# Patient Record
Sex: FEMALE | Race: WHITE | NOT HISPANIC OR LATINO
[De-identification: names, ages, dates, MRNs, and addresses within clinical notes are randomized per-mention and may not be internally consistent; named-entity substitution may affect disease eponyms.]

---

## 2017-09-10 ENCOUNTER — HOSPITAL (OUTPATIENT)
Dept: OTHER | Age: 62
End: 2017-09-10
Attending: EMERGENCY MEDICINE

## 2017-09-10 LAB
ANALYZER ANC (IANC): NORMAL
ANION GAP SERPL CALC-SCNC: 10 MMOL/L (ref 10–20)
BASOPHILS # BLD: 0 THOUSAND/MCL (ref 0–0.3)
BASOPHILS NFR BLD: 1 %
BUN SERPL-MCNC: 11 MG/DL (ref 6–20)
BUN/CREAT SERPL: 21 (ref 7–25)
CALCIUM SERPL-MCNC: 9.1 MG/DL (ref 8.4–10.2)
CHLORIDE: 106 MMOL/L (ref 98–107)
CO2 SERPL-SCNC: 30 MMOL/L (ref 21–32)
CREAT SERPL-MCNC: 0.52 MG/DL (ref 0.51–0.95)
DIFFERENTIAL METHOD BLD: NORMAL
EOSINOPHIL # BLD: 0.2 THOUSAND/MCL (ref 0.1–0.5)
EOSINOPHIL NFR BLD: 2 %
ERYTHROCYTE [DISTWIDTH] IN BLOOD: 12.3 % (ref 11–15)
GLUCOSE SERPL-MCNC: 110 MG/DL (ref 65–99)
HEMATOCRIT: 41.6 % (ref 36–46.5)
HGB BLD-MCNC: 14 GM/DL (ref 12–15.5)
LYMPHOCYTES # BLD: 2.2 THOUSAND/MCL (ref 1–4)
LYMPHOCYTES NFR BLD: 28 %
MAGNESIUM SERPL-MCNC: 2 MG/DL (ref 1.7–2.4)
MCH RBC QN AUTO: 29.5 PG (ref 26–34)
MCHC RBC AUTO-ENTMCNC: 33.7 GM/DL (ref 32–36.5)
MCV RBC AUTO: 87.8 FL (ref 78–100)
MONOCYTES # BLD: 0.5 THOUSAND/MCL (ref 0.3–0.9)
MONOCYTES NFR BLD: 7 %
NEUTROPHILS # BLD: 5 THOUSAND/MCL (ref 1.8–7.7)
NEUTROPHILS NFR BLD: 62 %
NEUTS SEG NFR BLD: NORMAL %
PERCENT NRBC: NORMAL
PLATELET # BLD: 258 THOUSAND/MCL (ref 140–450)
POTASSIUM SERPL-SCNC: 3.5 MMOL/L (ref 3.4–5.1)
RBC # BLD: 4.74 MILLION/MCL (ref 4–5.2)
SODIUM SERPL-SCNC: 142 MMOL/L (ref 135–145)
TSH SERPL-ACNC: 0.72 MCUNIT/ML (ref 0.35–5)
WBC # BLD: 7.9 THOUSAND/MCL (ref 4.2–11)

## 2018-02-03 ENCOUNTER — HOSPITAL (OUTPATIENT)
Dept: OTHER | Age: 63
End: 2018-02-03
Attending: EMERGENCY MEDICINE

## 2020-09-14 ENCOUNTER — OFFICE VISIT (OUTPATIENT)
Dept: FAMILY MEDICINE CLINIC | Facility: CLINIC | Age: 65
End: 2020-09-14
Payer: MEDICAID

## 2020-09-14 VITALS
WEIGHT: 163.06 LBS | SYSTOLIC BLOOD PRESSURE: 115 MMHG | HEIGHT: 65 IN | BODY MASS INDEX: 27.17 KG/M2 | DIASTOLIC BLOOD PRESSURE: 73 MMHG | HEART RATE: 88 BPM

## 2020-09-14 DIAGNOSIS — Z12.31 SCREENING MAMMOGRAM, ENCOUNTER FOR: ICD-10-CM

## 2020-09-14 DIAGNOSIS — Z12.11 ENCOUNTER FOR SCREENING COLONOSCOPY: ICD-10-CM

## 2020-09-14 DIAGNOSIS — E05.90 HYPERTHYROIDISM: Primary | ICD-10-CM

## 2020-09-14 PROCEDURE — 3078F DIAST BP <80 MM HG: CPT | Performed by: FAMILY MEDICINE

## 2020-09-14 PROCEDURE — 3074F SYST BP LT 130 MM HG: CPT | Performed by: FAMILY MEDICINE

## 2020-09-14 PROCEDURE — 3008F BODY MASS INDEX DOCD: CPT | Performed by: FAMILY MEDICINE

## 2020-09-14 PROCEDURE — 99202 OFFICE O/P NEW SF 15 MIN: CPT | Performed by: FAMILY MEDICINE

## 2020-09-14 RX ORDER — METHIMAZOLE 5 MG/1
2.5 TABLET ORAL
COMMUNITY
Start: 2016-11-23 | End: 2021-04-10

## 2020-09-14 NOTE — PROGRESS NOTES
Blood pressure 115/73, pulse 88, height 5' 5\" (1.651 m), weight 163 lb 1 oz (74 kg). Presents today for an initial visit. Has a history of hyperthyroidism. Remote history of total hip arthroplasty.   Concerned about some discolored skin on her leg den

## 2020-09-14 NOTE — PATIENT INSTRUCTIONS
Colonoscopy     A camera attached to a flexible tube with a viewing lens is used to take video pictures.    Colonoscopy is a test to view the inside of your lower digestive tract (colon and rectum). Sometimes it can show the last part of the small intesti · You are given relaxing (sedating) medicine through an IV line. You may be drowsy, or fully asleep. · The healthcare provider will first give you a physical exam to check for anal and rectal problems.   · Then the anus is lubricated and the scope inserted

## 2020-09-16 ENCOUNTER — LAB ENCOUNTER (OUTPATIENT)
Dept: LAB | Age: 65
End: 2020-09-16
Attending: FAMILY MEDICINE
Payer: MEDICAID

## 2020-09-16 DIAGNOSIS — E05.90 HYPERTHYROIDISM: ICD-10-CM

## 2020-09-16 LAB
ALBUMIN SERPL-MCNC: 4.3 G/DL (ref 3.4–5)
ALBUMIN/GLOB SERPL: 1.2 {RATIO} (ref 1–2)
ALP LIVER SERPL-CCNC: 54 U/L (ref 50–130)
ALT SERPL-CCNC: 24 U/L (ref 13–56)
ANION GAP SERPL CALC-SCNC: 6 MMOL/L (ref 0–18)
AST SERPL-CCNC: 22 U/L (ref 15–37)
BILIRUB SERPL-MCNC: 0.3 MG/DL (ref 0.1–2)
BUN BLD-MCNC: 11 MG/DL (ref 7–18)
BUN/CREAT SERPL: 15.7 (ref 10–20)
CALCIUM BLD-MCNC: 9.5 MG/DL (ref 8.5–10.1)
CHLORIDE SERPL-SCNC: 106 MMOL/L (ref 98–112)
CHOLEST SMN-MCNC: 153 MG/DL (ref ?–200)
CO2 SERPL-SCNC: 28 MMOL/L (ref 21–32)
CREAT BLD-MCNC: 0.7 MG/DL (ref 0.55–1.02)
GLOBULIN PLAS-MCNC: 3.5 G/DL (ref 2.8–4.4)
GLUCOSE BLD-MCNC: 97 MG/DL (ref 70–99)
HDLC SERPL-MCNC: 46 MG/DL (ref 40–59)
LDLC SERPL CALC-MCNC: 81 MG/DL (ref ?–100)
M PROTEIN MFR SERPL ELPH: 7.8 G/DL (ref 6.4–8.2)
NONHDLC SERPL-MCNC: 107 MG/DL (ref ?–130)
OSMOLALITY SERPL CALC.SUM OF ELEC: 289 MOSM/KG (ref 275–295)
PATIENT FASTING Y/N/NP: YES
PATIENT FASTING Y/N/NP: YES
POTASSIUM SERPL-SCNC: 4 MMOL/L (ref 3.5–5.1)
SODIUM SERPL-SCNC: 140 MMOL/L (ref 136–145)
TRIGL SERPL-MCNC: 130 MG/DL (ref 30–149)
TSI SER-ACNC: 0.76 MIU/ML (ref 0.36–3.74)
VLDLC SERPL CALC-MCNC: 26 MG/DL (ref 0–30)

## 2020-09-16 PROCEDURE — 80061 LIPID PANEL: CPT

## 2020-09-16 PROCEDURE — 36415 COLL VENOUS BLD VENIPUNCTURE: CPT

## 2020-09-16 PROCEDURE — 80053 COMPREHEN METABOLIC PANEL: CPT

## 2020-09-16 PROCEDURE — 82306 VITAMIN D 25 HYDROXY: CPT

## 2020-09-16 PROCEDURE — 84443 ASSAY THYROID STIM HORMONE: CPT

## 2020-09-18 LAB — 25(OH)D3 SERPL-MCNC: 35.2 NG/ML (ref 30–100)

## 2020-09-19 ENCOUNTER — HOSPITAL ENCOUNTER (OUTPATIENT)
Dept: BONE DENSITY | Age: 65
Discharge: HOME | End: 2020-09-19
Attending: FAMILY MEDICINE
Payer: MEDICAID

## 2020-09-19 DIAGNOSIS — E05.90 HYPERTHYROIDISM: ICD-10-CM

## 2020-09-19 PROCEDURE — 77080 DXA BONE DENSITY AXIAL: CPT | Performed by: FAMILY MEDICINE

## 2020-10-02 ENCOUNTER — HOSPITAL ENCOUNTER (OUTPATIENT)
Dept: MAMMOGRAPHY | Age: 65
Discharge: HOME | End: 2020-10-02
Attending: FAMILY MEDICINE
Payer: MEDICAID

## 2020-10-02 DIAGNOSIS — Z12.31 SCREENING MAMMOGRAM, ENCOUNTER FOR: ICD-10-CM

## 2020-10-02 PROCEDURE — 77063 BREAST TOMOSYNTHESIS BI: CPT | Performed by: FAMILY MEDICINE

## 2020-10-02 PROCEDURE — 77067 SCR MAMMO BI INCL CAD: CPT | Performed by: FAMILY MEDICINE

## 2020-10-05 ENCOUNTER — LAB ENCOUNTER (OUTPATIENT)
Dept: LAB | Age: 65
End: 2020-10-05
Attending: INTERNAL MEDICINE
Payer: MEDICAID

## 2020-10-05 ENCOUNTER — HOSPITAL ENCOUNTER (OUTPATIENT)
Dept: GENERAL RADIOLOGY | Age: 65
Discharge: HOME | End: 2020-10-05
Attending: INTERNAL MEDICINE
Payer: MEDICAID

## 2020-10-05 ENCOUNTER — OFFICE VISIT (OUTPATIENT)
Dept: INTERNAL MEDICINE CLINIC | Facility: CLINIC | Age: 65
End: 2020-10-05
Payer: MEDICAID

## 2020-10-05 VITALS
BODY MASS INDEX: 26.82 KG/M2 | SYSTOLIC BLOOD PRESSURE: 130 MMHG | DIASTOLIC BLOOD PRESSURE: 72 MMHG | HEIGHT: 65 IN | RESPIRATION RATE: 18 BRPM | WEIGHT: 161 LBS | HEART RATE: 76 BPM

## 2020-10-05 DIAGNOSIS — R92.8 ABNORMAL MAMMOGRAM OF BOTH BREASTS: ICD-10-CM

## 2020-10-05 DIAGNOSIS — Z12.11 COLON CANCER SCREENING: ICD-10-CM

## 2020-10-05 DIAGNOSIS — Z98.890 HISTORY OF ARTHROPLASTY OF LEFT HIP: ICD-10-CM

## 2020-10-05 DIAGNOSIS — Z12.83 SCREENING FOR SKIN CANCER: ICD-10-CM

## 2020-10-05 DIAGNOSIS — Z01.00 EYE EXAM, ROUTINE: ICD-10-CM

## 2020-10-05 DIAGNOSIS — Z00.00 PHYSICAL EXAM, ANNUAL: Primary | ICD-10-CM

## 2020-10-05 DIAGNOSIS — Z00.00 PHYSICAL EXAM, ANNUAL: ICD-10-CM

## 2020-10-05 PROCEDURE — 99396 PREV VISIT EST AGE 40-64: CPT | Performed by: INTERNAL MEDICINE

## 2020-10-05 PROCEDURE — 3075F SYST BP GE 130 - 139MM HG: CPT | Performed by: INTERNAL MEDICINE

## 2020-10-05 PROCEDURE — 90686 IIV4 VACC NO PRSV 0.5 ML IM: CPT | Performed by: INTERNAL MEDICINE

## 2020-10-05 PROCEDURE — 73502 X-RAY EXAM HIP UNI 2-3 VIEWS: CPT | Performed by: INTERNAL MEDICINE

## 2020-10-05 PROCEDURE — 90471 IMMUNIZATION ADMIN: CPT | Performed by: INTERNAL MEDICINE

## 2020-10-05 PROCEDURE — 3078F DIAST BP <80 MM HG: CPT | Performed by: INTERNAL MEDICINE

## 2020-10-05 PROCEDURE — 85025 COMPLETE CBC W/AUTO DIFF WBC: CPT

## 2020-10-05 PROCEDURE — 36415 COLL VENOUS BLD VENIPUNCTURE: CPT

## 2020-10-05 PROCEDURE — 3008F BODY MASS INDEX DOCD: CPT | Performed by: INTERNAL MEDICINE
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
## 2020-10-06 ENCOUNTER — LAB ENCOUNTER (OUTPATIENT)
Dept: LAB | Age: 65
End: 2020-10-06
Attending: INTERNAL MEDICINE
Payer: MEDICAID

## 2020-10-06 DIAGNOSIS — Z00.00 PHYSICAL EXAM, ANNUAL: ICD-10-CM

## 2020-10-06 DIAGNOSIS — Z12.11 COLON CANCER SCREENING: ICD-10-CM

## 2020-10-06 PROCEDURE — 82274 ASSAY TEST FOR BLOOD FECAL: CPT

## 2020-10-11 PROBLEM — E05.90 HYPERTHYROIDISM: Status: ACTIVE | Noted: 2020-10-11

## 2020-10-11 PROBLEM — Z96.642 HISTORY OF TOTAL LEFT HIP ARTHROPLASTY: Status: ACTIVE | Noted: 2020-10-11

## 2020-10-13 ENCOUNTER — HOSPITAL ENCOUNTER (OUTPATIENT)
Dept: MAMMOGRAPHY | Facility: HOSPITAL | Age: 65
Discharge: HOME | End: 2020-10-13
Attending: FAMILY MEDICINE
Payer: MEDICAID

## 2020-10-13 ENCOUNTER — HOSPITAL ENCOUNTER (OUTPATIENT)
Dept: ULTRASOUND IMAGING | Facility: HOSPITAL | Age: 65
Discharge: HOME | End: 2020-10-13
Attending: FAMILY MEDICINE
Payer: MEDICAID

## 2020-10-13 DIAGNOSIS — R92.8 ABNORMAL MAMMOGRAM: ICD-10-CM

## 2020-10-13 PROCEDURE — 76642 ULTRASOUND BREAST LIMITED: CPT | Performed by: FAMILY MEDICINE

## 2020-10-13 PROCEDURE — 77066 DX MAMMO INCL CAD BI: CPT | Performed by: FAMILY MEDICINE

## 2020-10-13 PROCEDURE — 77062 BREAST TOMOSYNTHESIS BI: CPT | Performed by: FAMILY MEDICINE

## 2020-11-19 ENCOUNTER — OFFICE VISIT (OUTPATIENT)
Dept: DERMATOLOGY CLINIC | Facility: CLINIC | Age: 65
End: 2020-11-19
Payer: MEDICAID

## 2020-11-19 DIAGNOSIS — L82.1 SEBORRHEIC KERATOSES: Primary | ICD-10-CM

## 2020-11-19 DIAGNOSIS — D22.9 MULTIPLE NEVI: ICD-10-CM

## 2020-11-19 DIAGNOSIS — D23.60 BENIGN NEOPLASM OF SKIN OF UPPER LIMB, INCLUDING SHOULDER, UNSPECIFIED LATERALITY: ICD-10-CM

## 2020-11-19 DIAGNOSIS — D23.30 BENIGN NEOPLASM OF SKIN OF FACE: ICD-10-CM

## 2020-11-19 DIAGNOSIS — D23.70 BENIGN NEOPLASM OF SKIN OF LOWER LIMB, INCLUDING HIP, UNSPECIFIED LATERALITY: ICD-10-CM

## 2020-11-19 DIAGNOSIS — D23.4 BENIGN NEOPLASM OF SCALP AND SKIN OF NECK: ICD-10-CM

## 2020-11-19 DIAGNOSIS — D23.5 BENIGN NEOPLASM OF SKIN OF TRUNK, EXCEPT SCROTUM: ICD-10-CM

## 2020-11-19 PROCEDURE — 99203 OFFICE O/P NEW LOW 30 MIN: CPT | Performed by: DERMATOLOGY

## 2020-11-30 NOTE — PROGRESS NOTES
Nadege Sanchez is a 63year old female. HPI:     CC:  Patient presents with:  Full Skin Exam: new patient. patient present for a full body exam , patient states she has numerous moles on back and around waist and under breasts,some mole are slightly dis Substance and Sexual Activity      Alcohol use: Yes        Frequency: Monthly or less        Drinks per session: 1 or 2        Binge frequency: Never      Drug use: Never      Sexual activity: Not on file    Lifestyle      Physical activity        Days per reviewed as noted. ROS:  Denies any other systemic complaints. 10 point review of systems was completed. Pertinent positives and negatives noted in the the HPI. No new or changeing lesions other than noted above.  No fevers, chills, night sweats, unus Nevus at right anterior axilla. 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  Lesions on the waistline waxy tan papules and scattered nevi on posterior waistline. Multiple benign keratoses reassurance given.     Pattern lentigines no atypical features

## 2021-01-04 ENCOUNTER — OFFICE VISIT (OUTPATIENT)
Dept: OPTOMETRY | Facility: CLINIC | Age: 66
End: 2021-01-04
Payer: MEDICAID

## 2021-01-04 DIAGNOSIS — H52.4 HYPEROPIA WITH PRESBYOPIA, BILATERAL: ICD-10-CM

## 2021-01-04 DIAGNOSIS — H25.13 AGE-RELATED NUCLEAR CATARACT OF BOTH EYES: Primary | ICD-10-CM

## 2021-01-04 DIAGNOSIS — H52.03 HYPEROPIA WITH PRESBYOPIA, BILATERAL: ICD-10-CM

## 2021-01-04 PROCEDURE — 92004 COMPRE OPH EXAM NEW PT 1/>: CPT | Performed by: OPTOMETRIST

## 2021-01-04 PROCEDURE — 92015 DETERMINE REFRACTIVE STATE: CPT | Performed by: OPTOMETRIST

## 2021-01-04 NOTE — PATIENT INSTRUCTIONS
Hyperopia with presbyopia, bilateral  New glasses RX given to update as needed. Age-related nuclear cataract of both eyes  No treatment is required. Will continue to observe.

## 2021-01-04 NOTE — PROGRESS NOTES
Rancho Owens is a 63year old female. HPI:     HPI     Patient is in for an annual eye exam. Last EE was about two years ago for new glasses--wears FT bifocals.  Had a sharp pain in her right eye about two weeks ago that lasted a few seconds and sto Neuro/Psych     Oriented x3: Yes    Mood/Affect: Normal          Dilation     Both eyes: 1.0% Mydriacyl and 2.5% Omi Synephrine @ 1:10 PM            Additional Tests     [place]       Right Left     Normal Normal            Slit Lamp and Fundus Exam

## 2021-04-10 RX ORDER — METHIMAZOLE 5 MG/1
2.5 TABLET ORAL DAILY
Qty: 45 TABLET | Refills: 0 | Status: SHIPPED | OUTPATIENT
Start: 2021-04-10 | End: 2021-06-29

## 2021-04-10 NOTE — TELEPHONE ENCOUNTER
Patient requesting refill of thyroid medication Methimazole 2.5mg. Patient states she recently switched primary care doctors and now sees Pauline Gordon. Last office visit 10/05/21. Refill pended.

## 2021-04-24 ENCOUNTER — LAB ENCOUNTER (OUTPATIENT)
Dept: LAB | Age: 66
End: 2021-04-24
Attending: INTERNAL MEDICINE
Payer: MEDICAID

## 2021-04-24 ENCOUNTER — OFFICE VISIT (OUTPATIENT)
Dept: INTERNAL MEDICINE CLINIC | Facility: CLINIC | Age: 66
End: 2021-04-24
Payer: MEDICAID

## 2021-04-24 VITALS
HEART RATE: 82 BPM | SYSTOLIC BLOOD PRESSURE: 121 MMHG | DIASTOLIC BLOOD PRESSURE: 76 MMHG | WEIGHT: 168.19 LBS | RESPIRATION RATE: 17 BRPM | BODY MASS INDEX: 28.02 KG/M2 | HEIGHT: 65 IN

## 2021-04-24 DIAGNOSIS — E05.90 HYPERTHYROIDISM: ICD-10-CM

## 2021-04-24 DIAGNOSIS — E05.90 HYPERTHYROIDISM: Primary | ICD-10-CM

## 2021-04-24 PROCEDURE — 84481 FREE ASSAY (FT-3): CPT

## 2021-04-24 PROCEDURE — 3078F DIAST BP <80 MM HG: CPT | Performed by: INTERNAL MEDICINE

## 2021-04-24 PROCEDURE — 36415 COLL VENOUS BLD VENIPUNCTURE: CPT

## 2021-04-24 PROCEDURE — 3008F BODY MASS INDEX DOCD: CPT | Performed by: INTERNAL MEDICINE

## 2021-04-24 PROCEDURE — 99213 OFFICE O/P EST LOW 20 MIN: CPT | Performed by: INTERNAL MEDICINE

## 2021-04-24 PROCEDURE — 80053 COMPREHEN METABOLIC PANEL: CPT

## 2021-04-24 PROCEDURE — 84443 ASSAY THYROID STIM HORMONE: CPT

## 2021-04-24 PROCEDURE — 84439 ASSAY OF FREE THYROXINE: CPT

## 2021-04-24 PROCEDURE — 85025 COMPLETE CBC W/AUTO DIFF WBC: CPT

## 2021-04-24 PROCEDURE — 3074F SYST BP LT 130 MM HG: CPT | Performed by: INTERNAL MEDICINE

## 2021-04-24 NOTE — PROGRESS NOTES
HPI/Subjective:   Patient ID: Kizzy Haro is a 63year old female.   Presents for follow-up on hypertension thyroidism    HPI  Patient reports that she has been feeling well, denies palpitations, anxiety, no weight loss even gained weight over the win Hyperthyroidism  (primary encounter diagnosis)  Will check labs CBC CMP TSH with reflex, see endocrinology, continue methimazole for now  Orders Placed This Encounter      CBC W Differential W Platelet [E]      Comp Metabolic Panel (14) [E]      TSH [E]

## 2021-06-29 ENCOUNTER — OFFICE VISIT (OUTPATIENT)
Dept: ENDOCRINOLOGY CLINIC | Facility: CLINIC | Age: 66
End: 2021-06-29
Payer: MEDICAID

## 2021-06-29 VITALS
DIASTOLIC BLOOD PRESSURE: 77 MMHG | BODY MASS INDEX: 27.32 KG/M2 | SYSTOLIC BLOOD PRESSURE: 131 MMHG | HEART RATE: 76 BPM | WEIGHT: 164 LBS | HEIGHT: 65 IN | RESPIRATION RATE: 18 BRPM

## 2021-06-29 DIAGNOSIS — E05.20 TOXIC MULTINODULAR GOITER: Primary | ICD-10-CM

## 2021-06-29 PROCEDURE — 99244 OFF/OP CNSLTJ NEW/EST MOD 40: CPT | Performed by: INTERNAL MEDICINE

## 2021-06-29 PROCEDURE — 3008F BODY MASS INDEX DOCD: CPT | Performed by: INTERNAL MEDICINE

## 2021-06-29 PROCEDURE — 3078F DIAST BP <80 MM HG: CPT | Performed by: INTERNAL MEDICINE

## 2021-06-29 PROCEDURE — 3075F SYST BP GE 130 - 139MM HG: CPT | Performed by: INTERNAL MEDICINE

## 2021-06-29 RX ORDER — METHIMAZOLE 5 MG/1
2.5 TABLET ORAL EVERY MORNING
Qty: 45 TABLET | Refills: 1 | Status: SHIPPED | OUTPATIENT
Start: 2021-06-29 | End: 2022-01-19

## 2021-06-29 NOTE — H&P
Name: Susan Zamarripa  Date: 6/29/2021    Referring Physician: Madelin Lu    No chief complaint on file. HISTORY OF PRESENT ILLNESS   Susan Zamarripa is a 63year old female who presents for No chief complaint on file.   .  This is a 63year-old or discomfort while urinating  Psychiatric:  no acute distress, anxiety  or depression  Skin: normal moisturized skin    PHYSICAL EXAMINATION:  /77   Pulse 76   Resp 18   Ht 5' 5\" (1.651 m)   Wt 164 lb (74.4 kg)   BMI 27.29 kg/m²     General Appeara s.d.     OSTEOPOROSIS T SCORE: -2.5 s.d.       National Osteoporosis Foundation Clinician's Guide to Prevention and Treatment of Osteoporosis recommendations for treatment:   Post menopausal women and men age 48 and older presenting with the following shou ASPIRATION    Diagnosis:    1.  THYROID, RIGHT LOBE, ULTRASOUND-GUIDED FNA:    SATISFACTORY FOR EVALUATION. BENIGN. BENIGN FOLLICULAR NODULE. 2.  THYROID, LEFT LOBE, ULTRASOUND-GUIDED FNA:    SATISFACTORY FOR EVALUATION. BENIGN.   BENIGN FOLLICULAR N months after receiving SHAIKH.     ATD:  Continue with Methimazole 2.5mg PO qday  - Side effects discussed:  > pruritus, rash, urticaria, arthralgias, arthritis, fever, abnormal taste sensation, nausea, or vomiting in up to 13 percent of patients   > agranuloc

## 2022-01-04 ENCOUNTER — HOSPITAL ENCOUNTER (OUTPATIENT)
Dept: ULTRASOUND IMAGING | Age: 67
Discharge: HOME | End: 2022-01-04
Attending: INTERNAL MEDICINE
Payer: MEDICAID

## 2022-01-04 ENCOUNTER — LAB ENCOUNTER (OUTPATIENT)
Dept: LAB | Age: 67
End: 2022-01-04
Attending: INTERNAL MEDICINE
Payer: MEDICAID

## 2022-01-04 DIAGNOSIS — E05.20 TOXIC MULTINODULAR GOITER: ICD-10-CM

## 2022-01-04 LAB
ALBUMIN SERPL-MCNC: 4.1 G/DL (ref 3.4–5)
ALBUMIN/GLOB SERPL: 1.3 {RATIO} (ref 1–2)
ALP LIVER SERPL-CCNC: 54 U/L
ALT SERPL-CCNC: 24 U/L
ANION GAP SERPL CALC-SCNC: 6 MMOL/L (ref 0–18)
AST SERPL-CCNC: 23 U/L (ref 15–37)
BASOPHILS # BLD AUTO: 0.1 X10(3) UL (ref 0–0.2)
BASOPHILS NFR BLD AUTO: 1.1 %
BILIRUB SERPL-MCNC: 0.3 MG/DL (ref 0.1–2)
BUN BLD-MCNC: 13 MG/DL (ref 7–18)
BUN/CREAT SERPL: 15.1 (ref 10–20)
CALCIUM BLD-MCNC: 9.3 MG/DL (ref 8.5–10.1)
CHLORIDE SERPL-SCNC: 106 MMOL/L (ref 98–112)
CO2 SERPL-SCNC: 28 MMOL/L (ref 21–32)
CREAT BLD-MCNC: 0.86 MG/DL
DEPRECATED RDW RBC AUTO: 38.4 FL (ref 35.1–46.3)
EOSINOPHIL # BLD AUTO: 0.32 X10(3) UL (ref 0–0.7)
EOSINOPHIL NFR BLD AUTO: 3.4 %
ERYTHROCYTE [DISTWIDTH] IN BLOOD BY AUTOMATED COUNT: 11.9 % (ref 11–15)
FASTING STATUS PATIENT QL REPORTED: NO
GLOBULIN PLAS-MCNC: 3.1 G/DL (ref 2.8–4.4)
GLUCOSE BLD-MCNC: 81 MG/DL (ref 70–99)
HCT VFR BLD AUTO: 41.3 %
HGB BLD-MCNC: 13.6 G/DL
IMM GRANULOCYTES # BLD AUTO: 0.03 X10(3) UL (ref 0–1)
IMM GRANULOCYTES NFR BLD: 0.3 %
LYMPHOCYTES # BLD AUTO: 2.77 X10(3) UL (ref 1–4)
LYMPHOCYTES NFR BLD AUTO: 29.3 %
MCH RBC QN AUTO: 29 PG (ref 26–34)
MCHC RBC AUTO-ENTMCNC: 32.9 G/DL (ref 31–37)
MCV RBC AUTO: 88.1 FL
MONOCYTES # BLD AUTO: 0.75 X10(3) UL (ref 0.1–1)
MONOCYTES NFR BLD AUTO: 7.9 %
NEUTROPHILS # BLD AUTO: 5.48 X10 (3) UL (ref 1.5–7.7)
NEUTROPHILS # BLD AUTO: 5.48 X10(3) UL (ref 1.5–7.7)
NEUTROPHILS NFR BLD AUTO: 58 %
OSMOLALITY SERPL CALC.SUM OF ELEC: 289 MOSM/KG (ref 275–295)
PLATELET # BLD AUTO: 265 10(3)UL (ref 150–450)
POTASSIUM SERPL-SCNC: 3.9 MMOL/L (ref 3.5–5.1)
PROT SERPL-MCNC: 7.2 G/DL (ref 6.4–8.2)
RBC # BLD AUTO: 4.69 X10(6)UL
SODIUM SERPL-SCNC: 140 MMOL/L (ref 136–145)
T3FREE SERPL-MCNC: 2.54 PG/ML (ref 2.4–4.2)
T4 FREE SERPL-MCNC: 1.1 NG/DL (ref 0.8–1.7)
TSI SER-ACNC: 0.78 MIU/ML (ref 0.36–3.74)
WBC # BLD AUTO: 9.5 X10(3) UL (ref 4–11)

## 2022-01-04 PROCEDURE — 85025 COMPLETE CBC W/AUTO DIFF WBC: CPT

## 2022-01-04 PROCEDURE — 76536 US EXAM OF HEAD AND NECK: CPT | Performed by: INTERNAL MEDICINE

## 2022-01-04 PROCEDURE — 84443 ASSAY THYROID STIM HORMONE: CPT

## 2022-01-04 PROCEDURE — 36415 COLL VENOUS BLD VENIPUNCTURE: CPT

## 2022-01-04 PROCEDURE — 80053 COMPREHEN METABOLIC PANEL: CPT

## 2022-01-04 PROCEDURE — 84481 FREE ASSAY (FT-3): CPT

## 2022-01-04 PROCEDURE — 84439 ASSAY OF FREE THYROXINE: CPT

## 2022-01-19 ENCOUNTER — OFFICE VISIT (OUTPATIENT)
Dept: ENDOCRINOLOGY CLINIC | Facility: CLINIC | Age: 67
End: 2022-01-19
Payer: MEDICAID

## 2022-01-19 VITALS
RESPIRATION RATE: 16 BRPM | DIASTOLIC BLOOD PRESSURE: 74 MMHG | HEART RATE: 81 BPM | WEIGHT: 167 LBS | HEIGHT: 65 IN | BODY MASS INDEX: 27.82 KG/M2 | SYSTOLIC BLOOD PRESSURE: 122 MMHG

## 2022-01-19 DIAGNOSIS — E05.20 TOXIC MULTINODULAR GOITER: Primary | ICD-10-CM

## 2022-01-19 PROCEDURE — 3008F BODY MASS INDEX DOCD: CPT | Performed by: INTERNAL MEDICINE

## 2022-01-19 PROCEDURE — 3074F SYST BP LT 130 MM HG: CPT | Performed by: INTERNAL MEDICINE

## 2022-01-19 PROCEDURE — 99214 OFFICE O/P EST MOD 30 MIN: CPT | Performed by: INTERNAL MEDICINE

## 2022-01-19 PROCEDURE — 3078F DIAST BP <80 MM HG: CPT | Performed by: INTERNAL MEDICINE

## 2022-01-19 RX ORDER — METHIMAZOLE 5 MG/1
2.5 TABLET ORAL EVERY MORNING
Qty: 45 TABLET | Refills: 1 | Status: SHIPPED | OUTPATIENT
Start: 2022-01-19

## 2022-01-19 NOTE — PROGRESS NOTES
Name: Daniel Wu  Date: 1/19/22    Referring Physician: No ref. provider found    Patient presents with:  Thyroid Problem: follow up on toxic multinodular goiter.        HISTORY OF PRESENT ILLNESS   Daniel Wu is a 61year old female who prese weakness or numbness.   Head: normal  ENT: normal  Lungs: no shortness of breath, wheezing or PURVIS  Cardiovascular:  no chest pain or palpitations  Gastrointestinal:  no abdominal pain, bowel movement problems  Musculoskeletal: no muscle pain or arthralgia recommendations for treatment:   Post menopausal women and men age 48 and older presenting with the following should be considered for treatment:   * A hip or vertebral (clinical or morphometric) fracture   * T score < -2.5 at the femoral neck or spine aft LEFT LOBE, ULTRASOUND-GUIDED FNA:    SATISFACTORY FOR EVALUATION. BENIGN. BENIGN FOLLICULAR NODULE. 3.  THYROID, LEFT INFERIOR, ULTRASOUND-GUIDED FNA:    SATISFACTORY FOR EVALUATION. BENIGN.   BENIGN FOLLICULAR NODULE.    3/14/28:  PROCEDURE: [address] (0)     Margin:   Smooth (0)     Echogenic Foci:   None or large comet tail artifacts (0)         Total Score:   3   TI-RADS CLASSIFICATION:  3           ISTHMUS:  0.2 cm AP diameter in the midline.  Normal echogenicity.  No masses.     OTHER:   No masses office if she experiences sore throat, fever, jaundice, dark urine, light stools, abdominal pain, anorexia, nausea, rash or joint pains,   Check TSH, FT4, FT3, CMP, CBC with diff in 6 months  Check Thyroid US in 1 year- I have discussed with patient that e

## 2022-03-17 ENCOUNTER — TELEPHONE (OUTPATIENT)
Dept: INTERNAL MEDICINE CLINIC | Facility: CLINIC | Age: 67
End: 2022-03-17

## 2022-03-17 DIAGNOSIS — Z12.11 COLON CANCER SCREENING: Primary | ICD-10-CM

## 2022-06-06 ENCOUNTER — NURSE TRIAGE (OUTPATIENT)
Dept: INTERNAL MEDICINE CLINIC | Facility: CLINIC | Age: 67
End: 2022-06-06

## 2022-06-06 ENCOUNTER — TELEPHONE (OUTPATIENT)
Dept: INTERNAL MEDICINE CLINIC | Facility: CLINIC | Age: 67
End: 2022-06-06

## 2022-06-06 ENCOUNTER — OFFICE VISIT (OUTPATIENT)
Dept: INTERNAL MEDICINE CLINIC | Facility: CLINIC | Age: 67
End: 2022-06-06
Payer: MEDICAID

## 2022-06-06 VITALS
SYSTOLIC BLOOD PRESSURE: 127 MMHG | DIASTOLIC BLOOD PRESSURE: 77 MMHG | WEIGHT: 171.38 LBS | HEART RATE: 79 BPM | BODY MASS INDEX: 28.55 KG/M2 | HEIGHT: 65 IN

## 2022-06-06 DIAGNOSIS — E05.90 HYPERTHYROIDISM: ICD-10-CM

## 2022-06-06 DIAGNOSIS — M79.605 PAIN OF LEFT LOWER EXTREMITY: ICD-10-CM

## 2022-06-06 DIAGNOSIS — R60.0 BILATERAL LEG EDEMA: ICD-10-CM

## 2022-06-06 DIAGNOSIS — I83.893 SYMPTOMATIC VARICOSE VEINS, BILATERAL: ICD-10-CM

## 2022-06-06 DIAGNOSIS — Z00.00 PHYSICAL EXAM, ANNUAL: Primary | ICD-10-CM

## 2022-06-06 DIAGNOSIS — M25.50 ARTHRALGIA, UNSPECIFIED JOINT: ICD-10-CM

## 2022-06-06 DIAGNOSIS — Z12.31 BREAST CANCER SCREENING BY MAMMOGRAM: ICD-10-CM

## 2022-06-06 PROCEDURE — 3074F SYST BP LT 130 MM HG: CPT | Performed by: INTERNAL MEDICINE

## 2022-06-06 PROCEDURE — 3078F DIAST BP <80 MM HG: CPT | Performed by: INTERNAL MEDICINE

## 2022-06-06 PROCEDURE — 99396 PREV VISIT EST AGE 40-64: CPT | Performed by: INTERNAL MEDICINE

## 2022-06-06 PROCEDURE — 3008F BODY MASS INDEX DOCD: CPT | Performed by: INTERNAL MEDICINE

## 2022-06-06 NOTE — TELEPHONE ENCOUNTER
Diclofenac gel is available over the counter     Spoke to patients daughter and informed her this is available over the counter

## 2022-06-08 ENCOUNTER — LAB ENCOUNTER (OUTPATIENT)
Dept: LAB | Age: 67
End: 2022-06-08
Attending: INTERNAL MEDICINE
Payer: MEDICAID

## 2022-06-08 DIAGNOSIS — R60.0 LOCALIZED EDEMA: ICD-10-CM

## 2022-06-08 DIAGNOSIS — Z00.00 ROUTINE GENERAL MEDICAL EXAMINATION AT A HEALTH CARE FACILITY: Primary | ICD-10-CM

## 2022-06-08 LAB
ALBUMIN SERPL-MCNC: 3.9 G/DL (ref 3.4–5)
ALBUMIN/GLOB SERPL: 1.1 {RATIO} (ref 1–2)
ALP LIVER SERPL-CCNC: 50 U/L
ALT SERPL-CCNC: 28 U/L
ANION GAP SERPL CALC-SCNC: 5 MMOL/L (ref 0–18)
AST SERPL-CCNC: 20 U/L (ref 15–37)
BASOPHILS # BLD AUTO: 0.09 X10(3) UL (ref 0–0.2)
BASOPHILS NFR BLD AUTO: 1.2 %
BILIRUB SERPL-MCNC: 0.3 MG/DL (ref 0.1–2)
BUN BLD-MCNC: 14 MG/DL (ref 7–18)
BUN/CREAT SERPL: 20.3 (ref 10–20)
CALCIUM BLD-MCNC: 9.4 MG/DL (ref 8.5–10.1)
CHLORIDE SERPL-SCNC: 109 MMOL/L (ref 98–112)
CHOLEST SERPL-MCNC: 167 MG/DL (ref ?–200)
CO2 SERPL-SCNC: 31 MMOL/L (ref 21–32)
CREAT BLD-MCNC: 0.69 MG/DL
CRP SERPL-MCNC: <0.29 MG/DL (ref ?–0.3)
D DIMER PPP FEU-MCNC: 0.87 UG/ML FEU (ref ?–0.64)
DEPRECATED RDW RBC AUTO: 40.4 FL (ref 35.1–46.3)
EOSINOPHIL # BLD AUTO: 0.39 X10(3) UL (ref 0–0.7)
EOSINOPHIL NFR BLD AUTO: 5.3 %
ERYTHROCYTE [DISTWIDTH] IN BLOOD BY AUTOMATED COUNT: 12 % (ref 11–15)
ERYTHROCYTE [SEDIMENTATION RATE] IN BLOOD: 9 MM/HR
FASTING PATIENT LIPID ANSWER: YES
FASTING STATUS PATIENT QL REPORTED: YES
GLOBULIN PLAS-MCNC: 3.6 G/DL (ref 2.8–4.4)
GLUCOSE BLD-MCNC: 100 MG/DL (ref 70–99)
HCT VFR BLD AUTO: 46.1 %
HDLC SERPL-MCNC: 44 MG/DL (ref 40–59)
HGB BLD-MCNC: 14.5 G/DL
IMM GRANULOCYTES # BLD AUTO: 0.01 X10(3) UL (ref 0–1)
IMM GRANULOCYTES NFR BLD: 0.1 %
LDLC SERPL CALC-MCNC: 94 MG/DL (ref ?–100)
LYMPHOCYTES # BLD AUTO: 2.8 X10(3) UL (ref 1–4)
LYMPHOCYTES NFR BLD AUTO: 37.7 %
MCH RBC QN AUTO: 28.9 PG (ref 26–34)
MCHC RBC AUTO-ENTMCNC: 31.5 G/DL (ref 31–37)
MCV RBC AUTO: 91.8 FL
MONOCYTES # BLD AUTO: 0.5 X10(3) UL (ref 0.1–1)
MONOCYTES NFR BLD AUTO: 6.7 %
NEUTROPHILS # BLD AUTO: 3.63 X10 (3) UL (ref 1.5–7.7)
NEUTROPHILS # BLD AUTO: 3.63 X10(3) UL (ref 1.5–7.7)
NEUTROPHILS NFR BLD AUTO: 49 %
NONHDLC SERPL-MCNC: 123 MG/DL (ref ?–130)
OSMOLALITY SERPL CALC.SUM OF ELEC: 301 MOSM/KG (ref 275–295)
PLATELET # BLD AUTO: 252 10(3)UL (ref 150–450)
POTASSIUM SERPL-SCNC: 4.4 MMOL/L (ref 3.5–5.1)
PROT SERPL-MCNC: 7.5 G/DL (ref 6.4–8.2)
RBC # BLD AUTO: 5.02 X10(6)UL
RHEUMATOID FACT SERPL-ACNC: <10 IU/ML (ref ?–15)
SODIUM SERPL-SCNC: 145 MMOL/L (ref 136–145)
T3FREE SERPL-MCNC: 3.25 PG/ML (ref 2.4–4.2)
T4 FREE SERPL-MCNC: 1.1 NG/DL (ref 0.8–1.7)
TRIGL SERPL-MCNC: 168 MG/DL (ref 30–149)
TSI SER-ACNC: 1.12 MIU/ML (ref 0.36–3.74)
VLDLC SERPL CALC-MCNC: 28 MG/DL (ref 0–30)
WBC # BLD AUTO: 7.4 X10(3) UL (ref 4–11)

## 2022-06-08 PROCEDURE — 82306 VITAMIN D 25 HYDROXY: CPT | Performed by: INTERNAL MEDICINE

## 2022-06-08 PROCEDURE — 82607 VITAMIN B-12: CPT | Performed by: INTERNAL MEDICINE

## 2022-06-08 PROCEDURE — 86431 RHEUMATOID FACTOR QUANT: CPT | Performed by: INTERNAL MEDICINE

## 2022-06-08 PROCEDURE — 85025 COMPLETE CBC W/AUTO DIFF WBC: CPT | Performed by: INTERNAL MEDICINE

## 2022-06-08 PROCEDURE — 84443 ASSAY THYROID STIM HORMONE: CPT | Performed by: INTERNAL MEDICINE

## 2022-06-08 PROCEDURE — 85379 FIBRIN DEGRADATION QUANT: CPT | Performed by: INTERNAL MEDICINE

## 2022-06-08 PROCEDURE — 80061 LIPID PANEL: CPT

## 2022-06-08 PROCEDURE — 84439 ASSAY OF FREE THYROXINE: CPT | Performed by: INTERNAL MEDICINE

## 2022-06-08 PROCEDURE — 84481 FREE ASSAY (FT-3): CPT | Performed by: INTERNAL MEDICINE

## 2022-06-08 PROCEDURE — 86140 C-REACTIVE PROTEIN: CPT | Performed by: INTERNAL MEDICINE

## 2022-06-08 PROCEDURE — 36415 COLL VENOUS BLD VENIPUNCTURE: CPT | Performed by: INTERNAL MEDICINE

## 2022-06-08 PROCEDURE — 80053 COMPREHEN METABOLIC PANEL: CPT | Performed by: INTERNAL MEDICINE

## 2022-06-08 PROCEDURE — 85652 RBC SED RATE AUTOMATED: CPT | Performed by: INTERNAL MEDICINE

## 2022-06-10 ENCOUNTER — HOSPITAL ENCOUNTER (OUTPATIENT)
Dept: ULTRASOUND IMAGING | Facility: HOSPITAL | Age: 67
Discharge: HOME | End: 2022-06-10
Attending: INTERNAL MEDICINE
Payer: MEDICAID

## 2022-06-10 DIAGNOSIS — M79.605 PAIN OF LEFT LOWER EXTREMITY: ICD-10-CM

## 2022-06-10 PROCEDURE — 93971 EXTREMITY STUDY: CPT | Performed by: INTERNAL MEDICINE

## 2022-06-16 ENCOUNTER — HOSPITAL ENCOUNTER (OUTPATIENT)
Dept: MAMMOGRAPHY | Age: 67
Discharge: HOME | End: 2022-06-16
Attending: INTERNAL MEDICINE
Payer: MEDICAID

## 2022-06-16 DIAGNOSIS — Z12.31 BREAST CANCER SCREENING BY MAMMOGRAM: ICD-10-CM

## 2022-06-16 PROCEDURE — 77067 SCR MAMMO BI INCL CAD: CPT | Performed by: INTERNAL MEDICINE

## 2022-06-16 PROCEDURE — 77063 BREAST TOMOSYNTHESIS BI: CPT | Performed by: INTERNAL MEDICINE

## 2022-06-29 ENCOUNTER — TELEPHONE (OUTPATIENT)
Dept: INTERNAL MEDICINE CLINIC | Facility: CLINIC | Age: 67
End: 2022-06-29

## 2022-06-29 NOTE — TELEPHONE ENCOUNTER
Pt's daughter stated Pt had pnuemonia vaccine yesterday, by evening had h/a, chills, nausea, decrease appetite, took 2 advils but mentioned was on empty stomach  Advised to continue to monitor s/s- give med with food for h/a, these are some side effects , stay hydrated, rest , stated she will check on her today, if s/s are worse will call back, no s/s of site of vaccine

## 2022-07-13 ENCOUNTER — OFFICE VISIT (OUTPATIENT)
Dept: INTERNAL MEDICINE CLINIC | Facility: CLINIC | Age: 67
End: 2022-07-13
Payer: MEDICAID

## 2022-07-13 ENCOUNTER — LAB ENCOUNTER (OUTPATIENT)
Dept: LAB | Age: 67
End: 2022-07-13
Attending: INTERNAL MEDICINE
Payer: MEDICAID

## 2022-07-13 VITALS
HEIGHT: 65 IN | DIASTOLIC BLOOD PRESSURE: 84 MMHG | BODY MASS INDEX: 28.32 KG/M2 | SYSTOLIC BLOOD PRESSURE: 151 MMHG | WEIGHT: 170 LBS | OXYGEN SATURATION: 97 % | HEART RATE: 82 BPM

## 2022-07-13 DIAGNOSIS — M25.561 ACUTE PAIN OF RIGHT KNEE: ICD-10-CM

## 2022-07-13 DIAGNOSIS — Z01.419 ENCOUNTER FOR ROUTINE GYNECOLOGICAL EXAMINATION WITH PAPANICOLAOU SMEAR OF CERVIX: Primary | ICD-10-CM

## 2022-07-13 DIAGNOSIS — M62.838 MUSCLE SPASM: ICD-10-CM

## 2022-07-13 DIAGNOSIS — M25.542 ARTHRALGIA OF BOTH HANDS: ICD-10-CM

## 2022-07-13 DIAGNOSIS — M25.541 ARTHRALGIA OF BOTH HANDS: ICD-10-CM

## 2022-07-13 DIAGNOSIS — Z12.11 COLON CANCER SCREENING: ICD-10-CM

## 2022-07-13 LAB
C3 SERPL-MCNC: 114 MG/DL (ref 90–180)
C4 SERPL-MCNC: 34.4 MG/DL (ref 10–40)
MAGNESIUM SERPL-MCNC: 2.4 MG/DL (ref 1.6–2.6)
URATE SERPL-MCNC: 3.9 MG/DL

## 2022-07-13 PROCEDURE — 86225 DNA ANTIBODY NATIVE: CPT

## 2022-07-13 PROCEDURE — 86160 COMPLEMENT ANTIGEN: CPT | Performed by: INTERNAL MEDICINE

## 2022-07-13 PROCEDURE — 86039 ANTINUCLEAR ANTIBODIES (ANA): CPT

## 2022-07-13 PROCEDURE — 3077F SYST BP >= 140 MM HG: CPT | Performed by: INTERNAL MEDICINE

## 2022-07-13 PROCEDURE — 86038 ANTINUCLEAR ANTIBODIES: CPT

## 2022-07-13 PROCEDURE — 99214 OFFICE O/P EST MOD 30 MIN: CPT | Performed by: INTERNAL MEDICINE

## 2022-07-13 PROCEDURE — 84550 ASSAY OF BLOOD/URIC ACID: CPT | Performed by: INTERNAL MEDICINE

## 2022-07-13 PROCEDURE — 36415 COLL VENOUS BLD VENIPUNCTURE: CPT | Performed by: INTERNAL MEDICINE

## 2022-07-13 PROCEDURE — 86235 NUCLEAR ANTIGEN ANTIBODY: CPT | Performed by: INTERNAL MEDICINE

## 2022-07-13 PROCEDURE — 3079F DIAST BP 80-89 MM HG: CPT | Performed by: INTERNAL MEDICINE

## 2022-07-13 PROCEDURE — 3008F BODY MASS INDEX DOCD: CPT | Performed by: INTERNAL MEDICINE

## 2022-07-13 PROCEDURE — 83735 ASSAY OF MAGNESIUM: CPT | Performed by: INTERNAL MEDICINE

## 2022-07-13 PROCEDURE — 86200 CCP ANTIBODY: CPT | Performed by: INTERNAL MEDICINE

## 2022-07-13 RX ORDER — ALPRAZOLAM 0.25 MG/1
0.25 TABLET ORAL 2 TIMES DAILY PRN
Qty: 20 TABLET | Refills: 0 | Status: SHIPPED | OUTPATIENT
Start: 2022-07-13

## 2022-07-13 RX ORDER — MELOXICAM 15 MG/1
15 TABLET ORAL DAILY
Qty: 90 TABLET | Refills: 0 | Status: SHIPPED | OUTPATIENT
Start: 2022-07-13

## 2022-07-14 ENCOUNTER — HOSPITAL ENCOUNTER (OUTPATIENT)
Dept: GENERAL RADIOLOGY | Age: 67
Discharge: HOME | End: 2022-07-14
Attending: INTERNAL MEDICINE
Payer: MEDICAID

## 2022-07-14 ENCOUNTER — LAB ENCOUNTER (OUTPATIENT)
Dept: LAB | Age: 67
End: 2022-07-14
Attending: INTERNAL MEDICINE
Payer: MEDICAID

## 2022-07-14 DIAGNOSIS — M25.541 ARTHRALGIA OF BOTH HANDS: ICD-10-CM

## 2022-07-14 DIAGNOSIS — M25.561 ACUTE PAIN OF RIGHT KNEE: ICD-10-CM

## 2022-07-14 DIAGNOSIS — M25.542 ARTHRALGIA OF BOTH HANDS: ICD-10-CM

## 2022-07-14 LAB
HEMOCCULT STL QL: NEGATIVE
NUCLEAR IGG TITR SER IF: POSITIVE {TITER}

## 2022-07-14 PROCEDURE — 82274 ASSAY TEST FOR BLOOD FECAL: CPT | Performed by: INTERNAL MEDICINE

## 2022-07-14 PROCEDURE — 73130 X-RAY EXAM OF HAND: CPT | Performed by: INTERNAL MEDICINE

## 2022-07-15 LAB
ANA NUCLEOLAR TITR SER IF: 160 {TITER}
CCP IGG SERPL-ACNC: 1.7 U/ML (ref 0–6.9)
DSDNA AB TITR SER: <10 {TITER}

## 2022-07-18 ENCOUNTER — TELEPHONE (OUTPATIENT)
Dept: INTERNAL MEDICINE CLINIC | Facility: CLINIC | Age: 67
End: 2022-07-18

## 2022-07-18 DIAGNOSIS — M79.605 PAIN OF LEFT LOWER EXTREMITY: Primary | ICD-10-CM

## 2022-07-18 NOTE — TELEPHONE ENCOUNTER
Please  verify with daughter  what leg should be ultrasounded? She complained of pain in the  right leg?   I am out of the office until July 25, but I check messages at times

## 2022-07-18 NOTE — TELEPHONE ENCOUNTER
Talked to patient's daughter, no increased symptoms (no redness, or swelling) but she still has pain while taking the stairs. Per the daughter, she haed discussion with dr José Foster and agreed on ordering US duplex if symptoms persist, ordered.

## 2022-07-18 NOTE — TELEPHONE ENCOUNTER
Pt's daughter calling back -stated Pt was seen 7/13/22 discussed right leg pain, thought it was more muscle aches, was advised if continue will order US since she will be flying Wednesday   Daughter stated right leg not warmor red, no swelling or fever but concern since she has a long flight  Dr Nia Heredia out of office sent to oncall per protocol

## 2022-07-19 ENCOUNTER — PATIENT MESSAGE (OUTPATIENT)
Dept: OTHER | Age: 67
End: 2022-07-19

## 2022-07-19 DIAGNOSIS — M79.604 PAIN IN RIGHT LEG: Primary | ICD-10-CM

## 2022-07-19 NOTE — TELEPHONE ENCOUNTER
already addressed. Marcela Gallegos RN 7/19/2022  2:05 PM CDT        ----- Message -----  From: Fran Whiteside  Sent: 7/19/2022  10:47 AM CDT  To: Em Rn Triage  Subject: Leg ultrasound                                   It is the right calf area but it is a bit better today .

## 2022-07-20 LAB
ENA SS-A AB SER QL IA: NEGATIVE
ENA SS-B AB SER QL IA: NEGATIVE

## 2022-08-18 ENCOUNTER — MED REC SCAN ONLY (OUTPATIENT)
Dept: INTERNAL MEDICINE CLINIC | Facility: CLINIC | Age: 67
End: 2022-08-18

## 2022-08-23 ENCOUNTER — TELEPHONE (OUTPATIENT)
Dept: CASE MANAGEMENT | Age: 67
End: 2022-08-23

## 2022-08-23 NOTE — TELEPHONE ENCOUNTER
Hello, please see the message below. Patient's health insurance has a different primary care physician listed that is not part of the [facility] group. Patient would need to update this in order for the ultrasound to be approved and then contact us. If she is experiencing any severe or worsening symptoms she should go to the ER.

## 2022-08-23 NOTE — TELEPHONE ENCOUNTER
Good Morning Dr Abebe Chong and staff,    Ref# [id number]/ ultra sound    I am unable to obtain prior auth thru [address] [place] [address] Road for above ordered test.    Patients PCP is listed as Dr Dorothea Lester not an [facility] physician. Patient should not be seen at [facility] until she updates the PCP with [facility]. Last time her insurance card was copied into chart was 2020 which shows PCP Pratik . Please have office staff reach out to patient to contact her health plan to update her PCP to [place], [facility]. This referral will be closed since I am unable to obtain prior auth.     Thank you for your help    [facility]

## 2022-09-08 ENCOUNTER — OFFICE VISIT (OUTPATIENT)
Dept: RHEUMATOLOGY | Facility: CLINIC | Age: 67
End: 2022-09-08
Payer: MEDICAID

## 2022-09-08 VITALS
HEART RATE: 69 BPM | BODY MASS INDEX: 28.49 KG/M2 | WEIGHT: 171 LBS | HEIGHT: 65 IN | DIASTOLIC BLOOD PRESSURE: 70 MMHG | SYSTOLIC BLOOD PRESSURE: 126 MMHG

## 2022-09-08 DIAGNOSIS — M79.642 BILATERAL HAND PAIN: Primary | ICD-10-CM

## 2022-09-08 DIAGNOSIS — M79.641 BILATERAL HAND PAIN: Primary | ICD-10-CM

## 2022-09-08 DIAGNOSIS — M15.4 EROSIVE OSTEOARTHRITIS OF BOTH HANDS: ICD-10-CM

## 2022-09-08 PROCEDURE — 3074F SYST BP LT 130 MM HG: CPT | Performed by: INTERNAL MEDICINE

## 2022-09-08 PROCEDURE — 3078F DIAST BP <80 MM HG: CPT | Performed by: INTERNAL MEDICINE

## 2022-09-08 PROCEDURE — 99244 OFF/OP CNSLTJ NEW/EST MOD 40: CPT | Performed by: INTERNAL MEDICINE

## 2022-09-08 PROCEDURE — 3008F BODY MASS INDEX DOCD: CPT | Performed by: INTERNAL MEDICINE

## 2022-09-08 NOTE — PATIENT INSTRUCTIONS
You were seen today for pain in your hands  Blood work and x-rays show osteoarthritis  No evidence of rheumatoid arthritis  As your symptoms are worse in the winter please come back and see me at that time  We can then determine what medications we need to put you on

## 2022-09-24 DIAGNOSIS — E05.20 TOXIC MULTINODULAR GOITER: ICD-10-CM

## 2022-09-26 RX ORDER — METHIMAZOLE 5 MG/1
TABLET ORAL
Qty: 45 TABLET | Refills: 0 | Status: SHIPPED | OUTPATIENT
Start: 2022-09-26 | End: 2022-11-30

## 2022-09-26 NOTE — TELEPHONE ENCOUNTER
LOV 1/19/22  Pended 3 month supply for review. [facility] sent reminding patient she will be due for a follow up appointment.    Pended 3 month supply for review

## 2022-09-29 NOTE — TELEPHONE ENCOUNTER
Called pt, pt's daughter answered HIPAA verified, relayed MD's message below.  She verbalized understanding with no further questions

## 2022-10-21 ENCOUNTER — LAB ENCOUNTER (OUTPATIENT)
Dept: LAB | Age: 67
End: 2022-10-21
Attending: INTERNAL MEDICINE
Payer: MEDICAID

## 2022-10-21 DIAGNOSIS — E05.20 TOXIC MULTINODULAR GOITER: ICD-10-CM

## 2022-10-21 LAB
T4 FREE SERPL-MCNC: 1.2 NG/DL (ref 0.8–1.7)
TSI SER-ACNC: 0.27 MIU/ML (ref 0.36–3.74)

## 2022-10-21 PROCEDURE — 84439 ASSAY OF FREE THYROXINE: CPT

## 2022-10-21 PROCEDURE — 84443 ASSAY THYROID STIM HORMONE: CPT

## 2022-10-21 PROCEDURE — 36415 COLL VENOUS BLD VENIPUNCTURE: CPT

## 2022-11-11 ENCOUNTER — APPOINTMENT (OUTPATIENT)
Dept: GENERAL RADIOLOGY | Age: 67
End: 2022-11-11
Attending: EMERGENCY MEDICINE
Payer: MEDICAID

## 2022-11-11 ENCOUNTER — HOSPITAL ENCOUNTER (OUTPATIENT)
Age: 67
Discharge: HOME | End: 2022-11-11
Payer: MEDICAID

## 2022-11-11 ENCOUNTER — APPOINTMENT (OUTPATIENT)
Dept: GENERAL RADIOLOGY | Age: 67
End: 2022-11-11
Attending: NURSE PRACTITIONER
Payer: MEDICAID

## 2022-11-11 VITALS
RESPIRATION RATE: 20 BRPM | OXYGEN SATURATION: 98 % | SYSTOLIC BLOOD PRESSURE: 133 MMHG | HEART RATE: 83 BPM | TEMPERATURE: 98 F | DIASTOLIC BLOOD PRESSURE: 66 MMHG

## 2022-11-11 DIAGNOSIS — S60.512A ABRASION, HAND, LEFT, INITIAL ENCOUNTER: ICD-10-CM

## 2022-11-11 DIAGNOSIS — S20.212A CONTUSION OF RIB ON LEFT SIDE, INITIAL ENCOUNTER: ICD-10-CM

## 2022-11-11 DIAGNOSIS — S50.02XA CONTUSION OF LEFT ELBOW, INITIAL ENCOUNTER: ICD-10-CM

## 2022-11-11 DIAGNOSIS — S70.02XA CONTUSION OF LEFT HIP, INITIAL ENCOUNTER: ICD-10-CM
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
## 2022-11-11 PROCEDURE — 73502 X-RAY EXAM HIP UNI 2-3 VIEWS: CPT | Performed by: EMERGENCY MEDICINE

## 2022-11-11 PROCEDURE — 73080 X-RAY EXAM OF ELBOW: CPT | Performed by: EMERGENCY MEDICINE

## 2022-11-11 PROCEDURE — 71101 X-RAY EXAM UNILAT RIBS/CHEST: CPT | Performed by: EMERGENCY MEDICINE

## 2022-11-11 PROCEDURE — 73130 X-RAY EXAM OF HAND: CPT | Performed by: NURSE PRACTITIONER

## 2022-11-11 PROCEDURE — 99204 OFFICE O/P NEW MOD 45 MIN: CPT

## 2022-11-11 PROCEDURE — 99214 OFFICE O/P EST MOD 30 MIN: CPT

## 2022-11-11 RX ORDER — HYDROCODONE BITARTRATE AND ACETAMINOPHEN 5; 325 MG/1; MG/1
1 TABLET ORAL ONCE
Status: COMPLETED | OUTPATIENT
Start: 2022-11-11 | End: 2022-11-11

## 2022-11-11 NOTE — DISCHARGE INSTRUCTIONS
Continue over-the-counter ibuprofen or Tylenol for pain. You may try topical lidocaine patch or icy hot. You may apply over-the-counter bacitracin or Polysporin to abrasions on the hand and cover up with Band-Aids. You may alternate heat or ice to areas of discomfort. Follow-up with your primary care doctor if symptoms persist or worsen. If you develop worsening pain chest pain shortness of breath fever cough nausea or vomiting or pain location you did not initially have go to the nearest emergency department.

## 2022-11-11 NOTE — ED INITIAL ASSESSMENT (HPI)
Patient reports tripping and falling this afternoon while walking outside. + pain to left ribs, left elbow and left hip.  abrasions noted to left hand. Denies loc or striking her head.

## 2022-11-25 ENCOUNTER — TELEPHONE (OUTPATIENT)
Dept: ENDOCRINOLOGY CLINIC | Facility: CLINIC | Age: 67
End: 2022-11-25

## 2022-11-25 DIAGNOSIS — E05.20 TOXIC MULTINODULAR GOITER: Primary | ICD-10-CM

## 2022-11-26 NOTE — TELEPHONE ENCOUNTER
Hi!  Can we please contact this patient and find out if she is still taking the methimazole? Thank you!

## 2022-11-29 ENCOUNTER — TELEPHONE (OUTPATIENT)
Dept: INTERNAL MEDICINE CLINIC | Facility: CLINIC | Age: 67
End: 2022-11-29

## 2022-11-29 DIAGNOSIS — M15.0 PRIMARY GENERALIZED (OSTEO)ARTHRITIS: Primary | ICD-10-CM

## 2022-11-29 NOTE — TELEPHONE ENCOUNTER
Good Afternoon Dr Birtha Claude and staff    Please review pended order for Rheumatology, Dr Da Tellez if you agree with plan of care.     Thank you    Jody Germain

## 2022-11-29 NOTE — TELEPHONE ENCOUNTER
Patient is requesting referral.     Name of specialist and specialty department : Rheumatology Dr. Shila Jarrett  Reason for visit with the specialist:  Osteo Arhthritis  Address of the specialist office: [address] [facility]  Appointment date:    none but wants an appt for Jan.      [facility] informed patient the turnaround time for referral is 5-7 business days. Patient was informed to check their [employer] account for referral status.

## 2022-11-29 NOTE — TELEPHONE ENCOUNTER
Pt's daughter is calling back to correct that the pt is taking half tablets of the 5 mg, not a full tablet.     So she takes 2.5 mg for the methimazole

## 2022-11-30 RX ORDER — METHIMAZOLE 5 MG/1
5 TABLET ORAL EVERY MORNING
Qty: 90 TABLET | Refills: 0 | Status: SHIPPED | OUTPATIENT
Start: 2022-11-30 | End: 2023-02-28

## 2022-12-01 NOTE — TELEPHONE ENCOUNTER
Spoke to patient's daughter Stepan Waldron, verbalized understanding and acknowledged. No further questions.

## 2022-12-09 ENCOUNTER — OFFICE VISIT (OUTPATIENT)
Dept: RHEUMATOLOGY | Facility: CLINIC | Age: 67
End: 2022-12-09
Payer: MEDICARE

## 2022-12-09 VITALS
WEIGHT: 175 LBS | BODY MASS INDEX: 29.16 KG/M2 | HEIGHT: 65 IN | DIASTOLIC BLOOD PRESSURE: 79 MMHG | HEART RATE: 70 BPM | SYSTOLIC BLOOD PRESSURE: 132 MMHG

## 2022-12-09 DIAGNOSIS — M79.642 BILATERAL HAND PAIN: Primary | ICD-10-CM

## 2022-12-09 DIAGNOSIS — M79.641 BILATERAL HAND PAIN: Primary | ICD-10-CM

## 2022-12-09 DIAGNOSIS — L60.0 INGROWN NAIL OF GREAT TOE OF LEFT FOOT: ICD-10-CM

## 2022-12-09 RX ORDER — MELOXICAM 15 MG/1
15 TABLET ORAL DAILY
Qty: 30 TABLET | Refills: 0 | Status: SHIPPED | OUTPATIENT
Start: 2022-12-09

## 2022-12-09 NOTE — PATIENT INSTRUCTIONS
You were seen for joint pain in your hands  Try meloxicam once a day for your pain  Also prescribed the Voltaren gel  Get ultrasound of your hands

## 2022-12-28 ENCOUNTER — HOSPITAL ENCOUNTER (OUTPATIENT)
Dept: ULTRASOUND IMAGING | Facility: HOSPITAL | Age: 67
Discharge: HOME | End: 2022-12-28
Attending: NURSE PRACTITIONER
Payer: MEDICARE

## 2022-12-28 DIAGNOSIS — M79.604 PAIN IN RIGHT LEG: ICD-10-CM

## 2022-12-28 PROCEDURE — 93971 EXTREMITY STUDY: CPT | Performed by: NURSE PRACTITIONER

## 2023-01-19 ENCOUNTER — HOSPITAL ENCOUNTER (OUTPATIENT)
Dept: ULTRASOUND IMAGING | Age: 68
Discharge: HOME | End: 2023-01-19
Attending: INTERNAL MEDICINE
Payer: MEDICARE

## 2023-01-19 DIAGNOSIS — E05.20 TOXIC MULTINODULAR GOITER: ICD-10-CM

## 2023-01-19 PROCEDURE — 76536 US EXAM OF HEAD AND NECK: CPT | Performed by: INTERNAL MEDICINE

## 2023-01-24 ENCOUNTER — LAB ENCOUNTER (OUTPATIENT)
Dept: LAB | Facility: HOSPITAL | Age: 68
End: 2023-01-24
Attending: INTERNAL MEDICINE
Payer: MEDICARE

## 2023-01-24 ENCOUNTER — OFFICE VISIT (OUTPATIENT)
Dept: ENDOCRINOLOGY CLINIC | Facility: CLINIC | Age: 68
End: 2023-01-24

## 2023-01-24 VITALS
BODY MASS INDEX: 29 KG/M2 | WEIGHT: 177 LBS | SYSTOLIC BLOOD PRESSURE: 151 MMHG | HEART RATE: 88 BPM | DIASTOLIC BLOOD PRESSURE: 84 MMHG

## 2023-01-24 DIAGNOSIS — E05.20 TOXIC MULTINODULAR GOITER: ICD-10-CM

## 2023-01-24 DIAGNOSIS — E05.20 TOXIC MULTINODULAR GOITER: Primary | ICD-10-CM

## 2023-01-24 LAB
T3FREE SERPL-MCNC: 3.08 PG/ML (ref 2.4–4.2)
T4 FREE SERPL-MCNC: 1.1 NG/DL (ref 0.8–1.7)
TSI SER-ACNC: 1.24 MIU/ML (ref 0.36–3.74)

## 2023-01-24 PROCEDURE — 84443 ASSAY THYROID STIM HORMONE: CPT

## 2023-01-24 PROCEDURE — 99214 OFFICE O/P EST MOD 30 MIN: CPT | Performed by: INTERNAL MEDICINE

## 2023-01-24 PROCEDURE — 84439 ASSAY OF FREE THYROXINE: CPT

## 2023-01-24 PROCEDURE — 36415 COLL VENOUS BLD VENIPUNCTURE: CPT

## 2023-01-24 PROCEDURE — 84481 FREE ASSAY (FT-3): CPT

## 2023-01-25 ENCOUNTER — TELEPHONE (OUTPATIENT)
Dept: ENDOCRINOLOGY CLINIC | Facility: CLINIC | Age: 68
End: 2023-01-25

## 2023-01-25 DIAGNOSIS — E05.20 TOXIC MULTINODULAR GOITER: Primary | ICD-10-CM

## 2023-01-25 RX ORDER — METHIMAZOLE 5 MG/1
5 TABLET ORAL EVERY MORNING
Qty: 90 TABLET | Refills: 0 | Status: SHIPPED | OUTPATIENT
Start: 2023-01-25 | End: 2023-04-25

## 2023-01-25 NOTE — TELEPHONE ENCOUNTER
Called patient and relayed Dr. Elizabeth Lebron message as outlined below. Pt voiced understanding and denied further questions at this time.

## 2023-01-25 NOTE — TELEPHONE ENCOUNTER
Hi!  Please let patient know that I have reviewed her blood tests and that I would like her to stay on the current dose of MMI 5mg PO qday. I would like to check TSH and FT4 in 3 months once again. Thank you!

## 2023-01-26 ENCOUNTER — PATIENT MESSAGE (OUTPATIENT)
Dept: RHEUMATOLOGY | Facility: CLINIC | Age: 68
End: 2023-01-26

## 2023-01-26 ENCOUNTER — TELEPHONE (OUTPATIENT)
Dept: RHEUMATOLOGY | Facility: CLINIC | Age: 68
End: 2023-01-26

## 2023-01-26 ENCOUNTER — HOSPITAL ENCOUNTER (OUTPATIENT)
Dept: ULTRASOUND IMAGING | Facility: HOSPITAL | Age: 68
Discharge: HOME | End: 2023-01-26
Attending: INTERNAL MEDICINE
Payer: MEDICARE

## 2023-01-26 DIAGNOSIS — M79.641 BILATERAL HAND PAIN: ICD-10-CM

## 2023-01-26 DIAGNOSIS — M79.642 BILATERAL HAND PAIN: ICD-10-CM

## 2023-01-26 PROCEDURE — 76881 US COMPL JOINT R-T W/IMG: CPT | Performed by: INTERNAL MEDICINE

## 2023-01-27 NOTE — TELEPHONE ENCOUNTER
Spoke to patient and daughter Aston Rausch (HIPPA verified) relayed Dr. Carmen Alan message as shown below. Appointment offered and scheduled. Verbalized understanding and agreement to appointment made.

## 2023-01-30 ENCOUNTER — OFFICE VISIT (OUTPATIENT)
Dept: PODIATRY CLINIC | Facility: CLINIC | Age: 68
End: 2023-01-30

## 2023-01-30 VITALS — HEART RATE: 71 BPM | SYSTOLIC BLOOD PRESSURE: 155 MMHG | DIASTOLIC BLOOD PRESSURE: 88 MMHG

## 2023-01-30 DIAGNOSIS — L60.0 INGROWN TOENAIL: Primary | ICD-10-CM

## 2023-01-30 NOTE — PROGRESS NOTES
Per Dr Reyna Gaming request was draw one syringe with 6 ml Lidocaine 1% for this patient right big toe.  Patricia Sterling

## 2023-01-31 NOTE — PATIENT INSTRUCTIONS
Toenail Avulsion: Care Instructions    Your Care Instructions  Losing a toenail or fingernail because of an injury is called avulsion. The nail may be completely or partially torn off after a trauma to the area. Your doctor may have removed the nail, put part of it back into place, or repaired the nail bed. Your toe or finger may be sore after treatment. You may have stitches. You may have some swelling, color changes, and bloody crusting on or around the wound for 2 or 3 days. This is normal. Taking good care of your wound at home will help it heal quickly and reduce your chance of infection. The wound should heal within a few weeks. If completely removed, fingernails may take 6 months to grow back. Toenails may take 12 to 18 months to grow back. Injured nails may look different when they grow back. Follow-up care is a key part of your treatment and safety. Be sure to make and go to all appointments, and call your doctor if you are having problems. It's also a good idea to know your test results and keep a list of the medicines you take. How can you care for yourself at home?    -If possible, prop up the injured area on a pillow anytime you sit or lie down during the next 3 days. Try to keep it above the level of your heart. This will help reduce swelling. Leave the bandage on, and if you have stitches, do not get them wet for the first 24 to 48 hours. Use a plastic bag to cover the area when you shower. After the first 24 to 48 hours, you can remove the bandage and soak the foot in lukewarm water and betadine or epsom salt once daily. If the bandage sticks to the wound, use warm water to loosen it. Do not scrub or soak the area. You may cover the wound with a thin layer of neosporin and bandaid. , or gauze and coban bandage. Do not go swimming. If you have stitches, do not remove them on your own. Your doctor will tell you when to return to have the stitches removed.   Be safe with medicines. Take pain medicines exactly as directed. If the doctor gave you a prescription medicine for pain, take it as prescribed. If you are not taking a prescription pain medicine, ask your doctor if you can take an over-the-counter medicine. If your doctor prescribed antibiotics, take them as directed. Do not stop taking them just because you feel better. You need to take the full course of antibiotics. When should you call for help? Call your doctor now or seek immediate medical care if:    The skin near the wound is cool or pale or changes color. The wound starts to bleed, and blood soaks through the bandage. Oozing small amounts of blood is normal.  You have signs of infection, such as: Increased pain, swelling, warmth, or redness. Red streaks leading from your toe or finger. Pus draining from your toe or finger. Swollen lymph nodes in your neck, armpits, or groin. A fever. Watch closely for changes in your health, and be sure to contact your doctor if:    You have problems with the nail as it grows back. You do not get better as expected.

## 2023-02-03 ENCOUNTER — OFFICE VISIT (OUTPATIENT)
Dept: PODIATRY CLINIC | Facility: CLINIC | Age: 68
End: 2023-02-03

## 2023-02-03 VITALS — HEART RATE: 69 BPM | DIASTOLIC BLOOD PRESSURE: 83 MMHG | SYSTOLIC BLOOD PRESSURE: 134 MMHG

## 2023-02-03 DIAGNOSIS — L60.0 INGROWN TOENAIL OF LEFT FOOT: Primary | ICD-10-CM

## 2023-02-03 PROCEDURE — 11750 EXCISION NAIL&NAIL MATRIX: CPT | Performed by: STUDENT IN AN ORGANIZED HEALTH CARE EDUCATION/TRAINING PROGRAM

## 2023-02-15 ENCOUNTER — APPOINTMENT (OUTPATIENT)
Dept: ULTRASOUND IMAGING | Age: 68
End: 2023-02-15
Attending: EMERGENCY MEDICINE
Payer: MEDICARE

## 2023-02-15 ENCOUNTER — HOSPITAL ENCOUNTER (OUTPATIENT)
Age: 68
Discharge: HOME | End: 2023-02-15
Attending: EMERGENCY MEDICINE
Payer: MEDICARE

## 2023-02-15 ENCOUNTER — TELEPHONE (OUTPATIENT)
Dept: INTERNAL MEDICINE CLINIC | Facility: CLINIC | Age: 68
End: 2023-02-15

## 2023-02-15 VITALS
RESPIRATION RATE: 20 BRPM | SYSTOLIC BLOOD PRESSURE: 153 MMHG | HEART RATE: 80 BPM | DIASTOLIC BLOOD PRESSURE: 72 MMHG | TEMPERATURE: 98 F | OXYGEN SATURATION: 96 %

## 2023-02-15 DIAGNOSIS — I82.462 ACUTE DEEP VEIN THROMBOSIS (DVT) OF CALF MUSCLE VEIN OF LEFT LOWER EXTREMITY (HCC): Primary | ICD-10-CM

## 2023-02-15 DIAGNOSIS — M71.21 BAKER'S CYST OF KNEE, RIGHT: ICD-10-CM

## 2023-02-15 LAB
#MXD IC: 0.6 X10ˆ3/UL (ref 0.1–1)
BUN BLD-MCNC: 9 MG/DL (ref 7–18)
CHLORIDE BLD-SCNC: 102 MMOL/L (ref 98–112)
CO2 BLD-SCNC: 30 MMOL/L (ref 21–32)
CREAT BLD-MCNC: 0.7 MG/DL
GFR SERPLBLD BASED ON 1.73 SQ M-ARVRAT: 96 ML/MIN/1.73M2 (ref 60–?)
GLUCOSE BLD-MCNC: 104 MG/DL (ref 70–99)
HCT VFR BLD AUTO: 42.9 %
HCT VFR BLD CALC: 45 %
HGB BLD-MCNC: 14.1 G/DL
ISTAT IONIZED CALCIUM FOR CHEM 8: 1.19 MMOL/L (ref 1.12–1.32)
LYMPHOCYTES # BLD AUTO: 3.1 X10ˆ3/UL (ref 1–4)
LYMPHOCYTES NFR BLD AUTO: 35.5 %
MCH RBC QN AUTO: 28.5 PG (ref 26–34)
MCHC RBC AUTO-ENTMCNC: 32.9 G/DL (ref 31–37)
MCV RBC AUTO: 86.7 FL (ref 80–100)
MIXED CELL %: 6.7 %
NEUTROPHILS # BLD AUTO: 5.1 X10ˆ3/UL (ref 1.5–7.7)
NEUTROPHILS NFR BLD AUTO: 57.8 %
PLATELET # BLD AUTO: 268 X10ˆ3/UL (ref 150–450)
POTASSIUM BLD-SCNC: 4 MMOL/L (ref 3.6–5.1)
RBC # BLD AUTO: 4.95 X10ˆ6/UL
SODIUM BLD-SCNC: 142 MMOL/L (ref 136–145)
WBC # BLD AUTO: 8.8 X10ˆ3/UL (ref 4–11)

## 2023-02-15 PROCEDURE — 36415 COLL VENOUS BLD VENIPUNCTURE: CPT

## 2023-02-15 PROCEDURE — 93970 EXTREMITY STUDY: CPT | Performed by: EMERGENCY MEDICINE

## 2023-02-15 PROCEDURE — 99214 OFFICE O/P EST MOD 30 MIN: CPT

## 2023-02-15 PROCEDURE — 80047 BASIC METABLC PNL IONIZED CA: CPT

## 2023-02-15 PROCEDURE — 99215 OFFICE O/P EST HI 40 MIN: CPT

## 2023-02-15 PROCEDURE — 85025 COMPLETE CBC W/AUTO DIFF WBC: CPT | Performed by: EMERGENCY MEDICINE

## 2023-02-15 NOTE — ED INITIAL ASSESSMENT (HPI)
Patient with left lower extremity pain and swelling starting this morning. Left shin is tender to touch. Hx varicose veins.   Reports dvt after hip replacement in 2003

## 2023-02-16 ENCOUNTER — TELEPHONE (OUTPATIENT)
Dept: INTERNAL MEDICINE CLINIC | Facility: CLINIC | Age: 68
End: 2023-02-16

## 2023-02-16 ENCOUNTER — PATIENT MESSAGE (OUTPATIENT)
Dept: INTERNAL MEDICINE CLINIC | Facility: CLINIC | Age: 68
End: 2023-02-16

## 2023-02-16 DIAGNOSIS — I82.409 ACUTE DEEP VEIN THROMBOSIS (DVT) OF LOWER EXTREMITY, UNSPECIFIED LATERALITY, UNSPECIFIED VEIN (HCC): Primary | ICD-10-CM

## 2023-02-16 NOTE — TELEPHONE ENCOUNTER
Dr Marzena Duckworth contacted on-call provider. Patient seen at the [facility] and diagnosed with acute occlusive deep venous thrombosis of the paired distal left popliteal vein has developed. He discharged the patient with eliquis. She denied any chest pain or SOB. Her CBC  And Chem8 was WNL. He instructed the patient to follow up with her PCP.

## 2023-02-16 NOTE — TELEPHONE ENCOUNTER
She should NOT wear the stockings at this time to avoid increased pressure on the clot. She should see Vascular surgeon.  I will place the referral.

## 2023-02-16 NOTE — TELEPHONE ENCOUNTER
Dr Maryjo Lowe, please advise  Sent to Dexter Navas in office    Patient's Mitesh Archer calling (name and , TIFFANIE verified) states that the patient was seen in the [address] [place] [address] yesterday was dx with a blood clot in her left lower leg and she has a bakers cyst behind the right knee. Patient was started on the Eliquis from the [address] [place] [address] visit. IC visit 2/15/23    Can patient wear support stockings (socks)? Does she need to be measured? Family reports that they have the 15-20 light compression socks at home already, would this be okay for patient to wear? Family also has questions regarding the dx of the bakers cyst behind the right knee and interventions for that? Assisted with follow up appt: Future Appointments   Date Time Provider Alexis Sanchez   [facility]   2023  3:40 PM MD Andrew Mancuso     Family prefers to see PCP only.

## 2023-02-16 NOTE — TELEPHONE ENCOUNTER
Called daughter Isak Rao. Informed her of provider's recommendation. Daughter would also like to inform Dr Alhaji Steven of patient's condition and get her opinion also.

## 2023-02-17 NOTE — TELEPHONE ENCOUNTER
Darin Mart RN 2/17/2023 2:52 PM CST        ----- Message -----  From: Jackie Loop  Sent: 2/16/2023 3:11 PM CST  To: Em Rn Triage  Subject: Compression stockings     My apologizes , Dr Emperatriz Clark !  I was using the microphone to type this message, and your name was misspelled :-)

## 2023-02-17 NOTE — TELEPHONE ENCOUNTER
Daughter Katelynn Hurst calling back. Advised patient of Amy's note. She verbalized understanding but would still like to speak to Dr. Bob Carranza when she has a moment.      Katelynn Hurst: [phone]

## 2023-02-19 ENCOUNTER — TELEPHONE (OUTPATIENT)
Dept: INTERNAL MEDICINE CLINIC | Facility: CLINIC | Age: 68
End: 2023-02-19

## 2023-02-20 NOTE — TELEPHONE ENCOUNTER
Paging    Message # [phone]         2023 10:06a   [AMANDAM]  To:  From:  WALT Lindsey MD:  Phone#:  ----------------------------------------------------------------------  Melissa Sparrow [phone] RE EVELYN Song [facility] 67 RECENTLY HAS BLOOD CLOT ON LEFT LEG AND WE HAVE QUESTIONS Paged at number :  PAGE: [phone] at :  10:06

## 2023-02-20 NOTE — TELEPHONE ENCOUNTER
On rachel   Received page   pts daughter was with pt and pt is on blood thinner but ankle portion looks a little swollen and thinks its new   No redness or tenderness   Will monitor but if not better will go back to ER   Will f/u with pcp   Encounter closed

## 2023-02-21 ENCOUNTER — OFFICE VISIT (OUTPATIENT)
Dept: INTERNAL MEDICINE CLINIC | Facility: CLINIC | Age: 68
End: 2023-02-21

## 2023-02-21 VITALS
HEART RATE: 74 BPM | WEIGHT: 177 LBS | DIASTOLIC BLOOD PRESSURE: 77 MMHG | SYSTOLIC BLOOD PRESSURE: 137 MMHG | BODY MASS INDEX: 28.45 KG/M2 | HEIGHT: 66 IN

## 2023-02-21 DIAGNOSIS — B35.1 PAIN DUE TO ONYCHOMYCOSIS OF TOENAILS OF BOTH FEET: ICD-10-CM

## 2023-02-21 DIAGNOSIS — I82.4Z2 DVT, LOWER EXTREMITY, DISTAL, ACUTE, LEFT (HCC): Primary | ICD-10-CM

## 2023-02-21 DIAGNOSIS — M79.674 PAIN DUE TO ONYCHOMYCOSIS OF TOENAILS OF BOTH FEET: ICD-10-CM

## 2023-02-21 DIAGNOSIS — M79.675 PAIN DUE TO ONYCHOMYCOSIS OF TOENAILS OF BOTH FEET: ICD-10-CM

## 2023-02-21 PROCEDURE — 99214 OFFICE O/P EST MOD 30 MIN: CPT | Performed by: INTERNAL MEDICINE

## 2023-02-27 ENCOUNTER — TELEPHONE (OUTPATIENT)
Dept: HEMATOLOGY/ONCOLOGY | Facility: HOSPITAL | Age: 68
End: 2023-02-27

## 2023-02-27 NOTE — TELEPHONE ENCOUNTER
Patients Daughter  Reymundo King  Calling to schedule consult with Dr Daya Varma    Referring Provider: Conor Mares Referred To Provider: Solis Correia   Diagnosis: I82.4Z2 (ICD-10-CM) - 453.42 (ICD-9-CM) - DVT, lower extremity, distal, acute, left ([facility] [address]

## 2023-03-08 ENCOUNTER — OFFICE VISIT (OUTPATIENT)
Dept: HEMATOLOGY/ONCOLOGY | Facility: HOSPITAL | Age: 68
End: 2023-03-08
Attending: INTERNAL MEDICINE
Payer: MEDICARE

## 2023-03-08 VITALS
TEMPERATURE: 98 F | SYSTOLIC BLOOD PRESSURE: 146 MMHG | BODY MASS INDEX: 29.32 KG/M2 | OXYGEN SATURATION: 97 % | HEIGHT: 65 IN | HEART RATE: 70 BPM | RESPIRATION RATE: 16 BRPM | DIASTOLIC BLOOD PRESSURE: 77 MMHG | WEIGHT: 176 LBS

## 2023-03-08 DIAGNOSIS — Z51.81 ANTICOAGULATION MANAGEMENT ENCOUNTER: ICD-10-CM

## 2023-03-08 DIAGNOSIS — I82.402 DEEP VEIN THROMBOSIS (DVT) OF LEFT LOWER EXTREMITY, UNSPECIFIED CHRONICITY, UNSPECIFIED VEIN (HCC): Primary | ICD-10-CM

## 2023-03-08 DIAGNOSIS — Z79.01 ANTICOAGULATION MANAGEMENT ENCOUNTER: ICD-10-CM

## 2023-03-08 PROCEDURE — 99211 OFF/OP EST MAY X REQ PHY/QHP: CPT

## 2023-03-08 RX ORDER — CHOLECALCIFEROL (VITAMIN D3) 50 MCG
TABLET ORAL DAILY
COMMUNITY

## 2023-03-09 NOTE — CONSULTS
[facility]    PATIENT'S NAME: Boris Saunders   CONSULTING PHYSICIAN: [address] Omero Gonzales MD   PATIENT ACCOUNT #: [de-identified] LOCATION: [address] [place] [address] RECORD #: [id number] YOB: 1955   CONSULTATION DATE: 03/08/2023       [facility] NEW PATIENT CONSULT    REQUESTING PHYSICIAN:  Griffin Doshi MD.    REASON FOR CONSULTATION:  Left lower extremity DVT. HISTORY OF PRESENT ILLNESS:  The patient is a pleasant 59-year-old female being evaluated by Hematology for recurrent DVT. She was diagnosed with left lower extremity DVT 02/15/2023 and ultrasound in setting of left lower extremity swelling showed an acute occlusive DVT of one of the paired distal left popliteal veins. She previously had DVT treated with warfarin related to orthopedic issues 20 years ago. Her most recent episode has been treated with apixaban with improvement in her swelling. She denies any bruising or bleeding. She denies any chest pain. She is otherwise without complaints. PAST MEDICAL HISTORY:   DVT following orthopedic procedure 20 years ago, hyperthyroidism, history of left hip replacement, cataracts. MEDICATIONS:  Apixaban. SOCIAL HISTORY:  Denies any alcohol, tobacco, illicit drug use. FAMILY MEDICAL HISTORY:  No history of venous thromboembolic disease in the family. REVIEW OF SYSTEMS:  All other systems reviewed and negative x12. PHYSICAL EXAMINATION:    GENERAL:  No acute distress. Alert and oriented. VITAL SIGNS:  ECOG performance status is 0. Weight 79 kg. Blood pressure 146/77, pulse 70, respiratory 16, temperature 98.3, pulse ox 97% on room air. HEENT:  Moist mucous membranes. Oropharynx clear. NECK:  Supple. LUNGS:  Symmetric expansion. HEART:  Good distal pulses. ABDOMEN:  Soft. EXTREMITIES:  No edema. SKIN:  No visible lesions. LYMPHATICS:  No visible adenopathy. NEUROLOGIC:  Moving all extremities. Cranial nerves intact.   PSYCHIATRIC:  Appropriate mood, appropriate affect. MUSCULOSKELETAL:  No deformity. Ultrasound reviewed as per HPI. Please see above for details. ASSESSMENT AND PLAN:  The patient is a pleasant 68-year-old female being evaluated by Hematology for recurrent DVT. She had DVT  20 years ago after orthopedic procedure. As of 02/15/2023, she has left lower extremity DVT as outlined above. No removable provoking factor. She is doing well on apixaban. We will plan to treat her with 3 months of therapeutic anticoagulation and have her return to clinic with D-dimer. Given her history, we will plan to continue a preventative dose of apixaban 2.5 mg thereafter, if D-dimer has normalized. We would not recommend thrombophilia workup as this would not change our management. Thank you very much for the consultation request and for allowing us to participate in the care of this delightful patient. Dictated By Amanda Villatoro MD  d: 03/08/2023 13:40:42  t: 03/09/2023 01:05:26  Job [id number]  [facility]/    cc: Melida Litten, MD

## 2023-04-30 ENCOUNTER — OFFICE VISIT (OUTPATIENT)
Dept: FAMILY MEDICINE CLINIC | Facility: CLINIC | Age: 68
End: 2023-04-30
Payer: MEDICARE

## 2023-04-30 ENCOUNTER — HOSPITAL ENCOUNTER (OUTPATIENT)
Age: 68
Discharge: HOME | End: 2023-04-30
Attending: EMERGENCY MEDICINE
Payer: MEDICARE

## 2023-04-30 ENCOUNTER — APPOINTMENT (OUTPATIENT)
Dept: ULTRASOUND IMAGING | Age: 68
End: 2023-04-30
Attending: EMERGENCY MEDICINE
Payer: MEDICARE

## 2023-04-30 VITALS
DIASTOLIC BLOOD PRESSURE: 74 MMHG | RESPIRATION RATE: 16 BRPM | WEIGHT: 176 LBS | BODY MASS INDEX: 28.28 KG/M2 | TEMPERATURE: 97 F | SYSTOLIC BLOOD PRESSURE: 128 MMHG | HEART RATE: 75 BPM | HEIGHT: 66 IN | OXYGEN SATURATION: 97 %

## 2023-04-30 VITALS
TEMPERATURE: 98 F | HEART RATE: 74 BPM | SYSTOLIC BLOOD PRESSURE: 137 MMHG | OXYGEN SATURATION: 100 % | DIASTOLIC BLOOD PRESSURE: 69 MMHG | RESPIRATION RATE: 16 BRPM

## 2023-04-30 DIAGNOSIS — G89.29 CHRONIC PAIN OF RIGHT KNEE: ICD-10-CM

## 2023-04-30 DIAGNOSIS — M79.661 RIGHT CALF PAIN: Primary | ICD-10-CM

## 2023-04-30 DIAGNOSIS — M25.561 CHRONIC PAIN OF RIGHT KNEE: ICD-10-CM

## 2023-04-30 DIAGNOSIS — M79.661 PAIN OF RIGHT LOWER LEG: Primary | ICD-10-CM

## 2023-04-30 DIAGNOSIS — M25.561 ACUTE PAIN OF RIGHT KNEE: ICD-10-CM

## 2023-04-30 PROCEDURE — 99213 OFFICE O/P EST LOW 20 MIN: CPT

## 2023-04-30 PROCEDURE — 93971 EXTREMITY STUDY: CPT | Performed by: EMERGENCY MEDICINE

## 2023-04-30 PROCEDURE — 99214 OFFICE O/P EST MOD 30 MIN: CPT

## 2023-04-30 PROCEDURE — 99213 OFFICE O/P EST LOW 20 MIN: CPT | Performed by: PHYSICIAN ASSISTANT

## 2023-04-30 NOTE — ED INITIAL ASSESSMENT (HPI)
Patient with right leg pain posterior knee down to foot.   Pain for 10 months, has been seen by her doctor with diagnosis of bakers cyst  Patient with hx of blood clot to left leg  Pain is worse recently and is throbbing   No fevers  Was on a flight to and from [facility] a few weeks ago

## 2023-05-01 ENCOUNTER — TELEPHONE (OUTPATIENT)
Dept: INTERNAL MEDICINE CLINIC | Facility: CLINIC | Age: 68
End: 2023-05-01

## 2023-05-01 ENCOUNTER — OFFICE VISIT (OUTPATIENT)
Dept: INTERNAL MEDICINE CLINIC | Facility: CLINIC | Age: 68
End: 2023-05-01

## 2023-05-01 VITALS
BODY MASS INDEX: 28.45 KG/M2 | SYSTOLIC BLOOD PRESSURE: 136 MMHG | WEIGHT: 177 LBS | DIASTOLIC BLOOD PRESSURE: 80 MMHG | HEART RATE: 90 BPM | HEIGHT: 66 IN

## 2023-05-01 DIAGNOSIS — E05.20 TOXIC MULTINODULAR GOITER: ICD-10-CM

## 2023-05-01 DIAGNOSIS — E05.90 HYPERTHYROIDISM: ICD-10-CM

## 2023-05-01 DIAGNOSIS — Z12.31 ENCOUNTER FOR SCREENING MAMMOGRAM FOR MALIGNANT NEOPLASM OF BREAST: ICD-10-CM

## 2023-05-01 DIAGNOSIS — Z13.220 ENCOUNTER FOR SCREENING FOR LIPID DISORDER: ICD-10-CM

## 2023-05-01 DIAGNOSIS — H25.13 AGE-RELATED NUCLEAR CATARACT OF BOTH EYES: ICD-10-CM

## 2023-05-01 DIAGNOSIS — M71.21 BAKER CYST, RIGHT: ICD-10-CM

## 2023-05-01 DIAGNOSIS — Z78.0 POST-MENOPAUSAL: ICD-10-CM

## 2023-05-01 DIAGNOSIS — H52.4 HYPEROPIA WITH PRESBYOPIA, BILATERAL: ICD-10-CM

## 2023-05-01 DIAGNOSIS — H52.03 HYPEROPIA WITH PRESBYOPIA, BILATERAL: ICD-10-CM

## 2023-05-01 DIAGNOSIS — Z12.11 ENCOUNTER FOR SCREENING FOR MALIGNANT NEOPLASM OF COLON: ICD-10-CM

## 2023-05-01 DIAGNOSIS — Z13.0 SCREENING FOR DEFICIENCY ANEMIA: ICD-10-CM

## 2023-05-01 DIAGNOSIS — Z96.642 HISTORY OF TOTAL LEFT HIP ARTHROPLASTY: ICD-10-CM

## 2023-05-01 DIAGNOSIS — Z00.00 ENCOUNTER FOR ANNUAL HEALTH EXAMINATION: Primary | ICD-10-CM

## 2023-05-01 PROCEDURE — 99203 OFFICE O/P NEW LOW 30 MIN: CPT | Performed by: NURSE PRACTITIONER

## 2023-05-01 PROCEDURE — G0402 INITIAL PREVENTIVE EXAM: HCPCS | Performed by: NURSE PRACTITIONER

## 2023-05-01 NOTE — TELEPHONE ENCOUNTER
Patient's daughter, Ashley Mackenzie is calling (name and , TIFFANIE verified) to schedule follow up appt with PCP. There are no appts today with PCP so offered NP appt. Family is calling to schedule an follow up appt for bakers cyst on right leg. Patient had recent IC visit and ultrasound completed. Patient is having a lot of pain to her right lower leg.     Future Appointments   Date Time Provider Alexis Sanchez   2023  5:00 PM April CHOPRA [facility] [place]   2023  9:30 AM Lv Valente  [facility] ON[facility] EMO

## 2023-05-03 ENCOUNTER — LAB ENCOUNTER (OUTPATIENT)
Dept: LAB | Age: 68
End: 2023-05-03
Attending: INTERNAL MEDICINE
Payer: MEDICARE

## 2023-05-03 DIAGNOSIS — Z12.11 ENCOUNTER FOR SCREENING FOR MALIGNANT NEOPLASM OF COLON: ICD-10-CM

## 2023-05-03 DIAGNOSIS — E05.20 TOXIC MULTINODULAR GOITER: ICD-10-CM

## 2023-05-03 DIAGNOSIS — I82.402 DEEP VEIN THROMBOSIS (DVT) OF LEFT LOWER EXTREMITY, UNSPECIFIED CHRONICITY, UNSPECIFIED VEIN (HCC): ICD-10-CM

## 2023-05-03 DIAGNOSIS — E53.8 VITAMIN B12 DEFICIENCY: ICD-10-CM

## 2023-05-03 DIAGNOSIS — Z13.0 SCREENING FOR DEFICIENCY ANEMIA: ICD-10-CM

## 2023-05-03 DIAGNOSIS — Z13.220 ENCOUNTER FOR SCREENING FOR LIPID DISORDER: ICD-10-CM

## 2023-05-03 DIAGNOSIS — E05.90 HYPERTHYROIDISM: ICD-10-CM

## 2023-05-03 LAB
ALBUMIN SERPL-MCNC: 4 G/DL (ref 3.4–5)
ALBUMIN/GLOB SERPL: 1 {RATIO} (ref 1–2)
ALP LIVER SERPL-CCNC: 55 U/L
ALT SERPL-CCNC: 29 U/L
ANION GAP SERPL CALC-SCNC: 5 MMOL/L (ref 0–18)
AST SERPL-CCNC: 26 U/L (ref 15–37)
BASOPHILS # BLD AUTO: 0.06 X10(3) UL (ref 0–0.2)
BASOPHILS NFR BLD AUTO: 0.7 %
BILIRUB SERPL-MCNC: 0.3 MG/DL (ref 0.1–2)
BUN BLD-MCNC: 10 MG/DL (ref 7–18)
BUN/CREAT SERPL: 14.1 (ref 10–20)
CALCIUM BLD-MCNC: 9.2 MG/DL (ref 8.5–10.1)
CHLORIDE SERPL-SCNC: 107 MMOL/L (ref 98–112)
CHOLEST SERPL-MCNC: 122 MG/DL (ref ?–200)
CO2 SERPL-SCNC: 27 MMOL/L (ref 21–32)
CREAT BLD-MCNC: 0.71 MG/DL
D DIMER PPP FEU-MCNC: 0.81 UG/ML FEU (ref ?–0.65)
DEPRECATED RDW RBC AUTO: 40.6 FL (ref 35.1–46.3)
EOSINOPHIL # BLD AUTO: 0.29 X10(3) UL (ref 0–0.7)
EOSINOPHIL NFR BLD AUTO: 3.5 %
ERYTHROCYTE [DISTWIDTH] IN BLOOD BY AUTOMATED COUNT: 12.3 % (ref 11–15)
FASTING PATIENT LIPID ANSWER: YES
FASTING STATUS PATIENT QL REPORTED: YES
GFR SERPLBLD BASED ON 1.73 SQ M-ARVRAT: 94 ML/MIN/1.73M2 (ref 60–?)
GLOBULIN PLAS-MCNC: 3.9 G/DL (ref 2.8–4.4)
GLUCOSE BLD-MCNC: 116 MG/DL (ref 70–99)
HCT VFR BLD AUTO: 45.5 %
HDLC SERPL-MCNC: 39 MG/DL (ref 40–59)
HEMOCCULT STL QL: NEGATIVE
HGB BLD-MCNC: 14.6 G/DL
IMM GRANULOCYTES # BLD AUTO: 0.02 X10(3) UL (ref 0–1)
IMM GRANULOCYTES NFR BLD: 0.2 %
LDLC SERPL CALC-MCNC: 61 MG/DL (ref ?–100)
LYMPHOCYTES # BLD AUTO: 2.09 X10(3) UL (ref 1–4)
LYMPHOCYTES NFR BLD AUTO: 25 %
MCH RBC QN AUTO: 28.9 PG (ref 26–34)
MCHC RBC AUTO-ENTMCNC: 32.1 G/DL (ref 31–37)
MCV RBC AUTO: 89.9 FL
MONOCYTES # BLD AUTO: 0.74 X10(3) UL (ref 0.1–1)
MONOCYTES NFR BLD AUTO: 8.9 %
NEUTROPHILS # BLD AUTO: 5.16 X10 (3) UL (ref 1.5–7.7)
NEUTROPHILS # BLD AUTO: 5.16 X10(3) UL (ref 1.5–7.7)
NEUTROPHILS NFR BLD AUTO: 61.7 %
NONHDLC SERPL-MCNC: 83 MG/DL (ref ?–130)
OSMOLALITY SERPL CALC.SUM OF ELEC: 288 MOSM/KG (ref 275–295)
PLATELET # BLD AUTO: 262 10(3)UL (ref 150–450)
POTASSIUM SERPL-SCNC: 4.4 MMOL/L (ref 3.5–5.1)
PROT SERPL-MCNC: 7.9 G/DL (ref 6.4–8.2)
RBC # BLD AUTO: 5.06 X10(6)UL
SODIUM SERPL-SCNC: 139 MMOL/L (ref 136–145)
T3FREE SERPL-MCNC: 2.78 PG/ML (ref 2.4–4.2)
T4 FREE SERPL-MCNC: 1.2 NG/DL (ref 0.8–1.7)
TRIGL SERPL-MCNC: 125 MG/DL (ref 30–149)
TSI SER-ACNC: 0.95 MIU/ML (ref 0.36–3.74)
VIT B12 SERPL-MCNC: 360 PG/ML (ref 193–986)
VLDLC SERPL CALC-MCNC: 18 MG/DL (ref 0–30)
WBC # BLD AUTO: 8.4 X10(3) UL (ref 4–11)

## 2023-05-03 PROCEDURE — 82607 VITAMIN B-12: CPT

## 2023-05-03 PROCEDURE — 84443 ASSAY THYROID STIM HORMONE: CPT

## 2023-05-03 PROCEDURE — 36415 COLL VENOUS BLD VENIPUNCTURE: CPT

## 2023-05-03 PROCEDURE — 84481 FREE ASSAY (FT-3): CPT

## 2023-05-03 PROCEDURE — 82274 ASSAY TEST FOR BLOOD FECAL: CPT

## 2023-05-03 PROCEDURE — 80061 LIPID PANEL: CPT

## 2023-05-03 PROCEDURE — 84439 ASSAY OF FREE THYROXINE: CPT

## 2023-05-03 PROCEDURE — 85379 FIBRIN DEGRADATION QUANT: CPT

## 2023-05-03 PROCEDURE — 80053 COMPREHEN METABOLIC PANEL: CPT

## 2023-05-03 PROCEDURE — 85025 COMPLETE CBC W/AUTO DIFF WBC: CPT

## 2023-05-08 ENCOUNTER — OFFICE VISIT (OUTPATIENT)
Dept: HEMATOLOGY/ONCOLOGY | Facility: HOSPITAL | Age: 68
End: 2023-05-08
Attending: INTERNAL MEDICINE
Payer: MEDICARE

## 2023-05-08 VITALS
HEIGHT: 65 IN | WEIGHT: 174 LBS | BODY MASS INDEX: 28.99 KG/M2 | SYSTOLIC BLOOD PRESSURE: 144 MMHG | DIASTOLIC BLOOD PRESSURE: 67 MMHG | TEMPERATURE: 98 F | OXYGEN SATURATION: 98 % | RESPIRATION RATE: 16 BRPM | HEART RATE: 76 BPM

## 2023-05-08 DIAGNOSIS — I82.402 DEEP VEIN THROMBOSIS (DVT) OF LEFT LOWER EXTREMITY, UNSPECIFIED CHRONICITY, UNSPECIFIED VEIN (HCC): Primary | ICD-10-CM

## 2023-05-08 DIAGNOSIS — Z79.01 ANTICOAGULATION MANAGEMENT ENCOUNTER: ICD-10-CM

## 2023-05-08 DIAGNOSIS — Z51.81 ANTICOAGULATION MANAGEMENT ENCOUNTER: ICD-10-CM

## 2023-05-08 PROCEDURE — 99214 OFFICE O/P EST MOD 30 MIN: CPT | Performed by: INTERNAL MEDICINE

## 2023-06-01 ENCOUNTER — TELEPHONE (OUTPATIENT)
Dept: ORTHOPEDICS CLINIC | Facility: CLINIC | Age: 68
End: 2023-06-01

## 2023-06-13 DIAGNOSIS — E05.20 TOXIC MULTINODULAR GOITER: ICD-10-CM

## 2023-06-14 RX ORDER — METHIMAZOLE 5 MG/1
TABLET ORAL
Qty: 90 TABLET | Refills: 0 | Status: SHIPPED | OUTPATIENT
Start: 2023-06-14

## 2023-06-19 ENCOUNTER — OFFICE VISIT (OUTPATIENT)
Dept: ORTHOPEDICS CLINIC | Facility: CLINIC | Age: 68
End: 2023-06-19
Payer: MEDICARE

## 2023-06-19 ENCOUNTER — HOSPITAL ENCOUNTER (OUTPATIENT)
Dept: GENERAL RADIOLOGY | Age: 68
Discharge: HOME | End: 2023-06-19
Attending: ORTHOPAEDIC SURGERY
Payer: MEDICARE

## 2023-06-19 VITALS — WEIGHT: 174 LBS | HEIGHT: 65 IN | BODY MASS INDEX: 28.99 KG/M2

## 2023-06-19 DIAGNOSIS — G89.29 CHRONIC PAIN OF RIGHT KNEE: ICD-10-CM

## 2023-06-19 DIAGNOSIS — M25.561 CHRONIC PAIN OF RIGHT KNEE: ICD-10-CM

## 2023-06-19 DIAGNOSIS — M25.562 LEFT KNEE PAIN, UNSPECIFIED CHRONICITY: ICD-10-CM

## 2023-06-19 DIAGNOSIS — Z01.89 ENCOUNTER FOR LOWER EXTREMITY COMPARISON IMAGING STUDY: ICD-10-CM

## 2023-06-19 DIAGNOSIS — M17.11 PRIMARY OSTEOARTHRITIS OF RIGHT KNEE: Primary | ICD-10-CM

## 2023-06-19 PROCEDURE — 73564 X-RAY EXAM KNEE 4 OR MORE: CPT | Performed by: ORTHOPAEDIC SURGERY

## 2023-06-19 PROCEDURE — 73562 X-RAY EXAM OF KNEE 3: CPT | Performed by: ORTHOPAEDIC SURGERY

## 2023-06-19 PROCEDURE — 99204 OFFICE O/P NEW MOD 45 MIN: CPT | Performed by: ORTHOPAEDIC SURGERY

## 2023-06-20 ENCOUNTER — TELEPHONE (OUTPATIENT)
Dept: ENDOCRINOLOGY CLINIC | Facility: CLINIC | Age: 68
End: 2023-06-20

## 2023-06-20 DIAGNOSIS — E05.20 TOXIC MULTINODULAR GOITER: Primary | ICD-10-CM

## 2023-06-21 NOTE — TELEPHONE ENCOUNTER
Spoke with daughter Chris Santillan (HIPPA on file) and let her know message below, and that labs were already entered into system. Pt agreeable with plan of care.

## 2023-07-24 ENCOUNTER — LAB ENCOUNTER (OUTPATIENT)
Dept: LAB | Age: 68
End: 2023-07-24
Attending: INTERNAL MEDICINE
Payer: MEDICARE

## 2023-07-24 DIAGNOSIS — Z51.81 ANTICOAGULATION MANAGEMENT ENCOUNTER: ICD-10-CM

## 2023-07-24 DIAGNOSIS — E55.9 VITAMIN D DEFICIENCY: ICD-10-CM

## 2023-07-24 DIAGNOSIS — Z79.01 ANTICOAGULATION MANAGEMENT ENCOUNTER: ICD-10-CM

## 2023-07-24 DIAGNOSIS — I82.402 DEEP VEIN THROMBOSIS (DVT) OF LEFT LOWER EXTREMITY, UNSPECIFIED CHRONICITY, UNSPECIFIED VEIN (HCC): ICD-10-CM

## 2023-07-24 LAB
D DIMER PPP FEU-MCNC: 0.59 UG/ML FEU (ref ?–0.65)
VIT D+METAB SERPL-MCNC: 44.2 NG/ML (ref 30–100)

## 2023-07-24 PROCEDURE — 82306 VITAMIN D 25 HYDROXY: CPT

## 2023-07-24 PROCEDURE — 85379 FIBRIN DEGRADATION QUANT: CPT

## 2023-07-24 PROCEDURE — 36415 COLL VENOUS BLD VENIPUNCTURE: CPT

## 2023-07-25 ENCOUNTER — OFFICE VISIT (OUTPATIENT)
Dept: HEMATOLOGY/ONCOLOGY | Facility: HOSPITAL | Age: 68
End: 2023-07-25
Attending: INTERNAL MEDICINE
Payer: MEDICARE

## 2023-07-25 VITALS
TEMPERATURE: 98 F | RESPIRATION RATE: 16 BRPM | DIASTOLIC BLOOD PRESSURE: 76 MMHG | OXYGEN SATURATION: 97 % | WEIGHT: 167.63 LBS | HEART RATE: 84 BPM | HEIGHT: 65 IN | BODY MASS INDEX: 27.93 KG/M2 | SYSTOLIC BLOOD PRESSURE: 130 MMHG

## 2023-07-25 DIAGNOSIS — Z79.01 ANTICOAGULATION MANAGEMENT ENCOUNTER: ICD-10-CM

## 2023-07-25 DIAGNOSIS — I82.402 DEEP VEIN THROMBOSIS (DVT) OF LEFT LOWER EXTREMITY, UNSPECIFIED CHRONICITY, UNSPECIFIED VEIN (HCC): Primary | ICD-10-CM

## 2023-07-25 DIAGNOSIS — Z51.81 ANTICOAGULATION MANAGEMENT ENCOUNTER: ICD-10-CM

## 2023-07-25 PROCEDURE — 99214 OFFICE O/P EST MOD 30 MIN: CPT | Performed by: INTERNAL MEDICINE

## 2023-07-26 ENCOUNTER — APPOINTMENT (OUTPATIENT)
Dept: HEMATOLOGY/ONCOLOGY | Facility: HOSPITAL | Age: 68
End: 2023-07-26
Attending: INTERNAL MEDICINE
Payer: MEDICARE

## 2023-08-02 ENCOUNTER — TELEPHONE (OUTPATIENT)
Dept: INTERNAL MEDICINE CLINIC | Facility: CLINIC | Age: 68
End: 2023-08-02

## 2023-08-02 NOTE — TELEPHONE ENCOUNTER
----- Message from Shira Peck, [address] sent at 8/2/2023 10:02 AM CDT -----  Please notify patient

## 2023-08-08 ENCOUNTER — APPOINTMENT (OUTPATIENT)
Dept: HEMATOLOGY/ONCOLOGY | Facility: HOSPITAL | Age: 68
End: 2023-08-08
Attending: INTERNAL MEDICINE
Payer: MEDICARE

## 2023-09-07 DIAGNOSIS — E05.20 TOXIC MULTINODULAR GOITER: ICD-10-CM

## 2023-09-07 RX ORDER — METHIMAZOLE 5 MG/1
5 TABLET ORAL EVERY MORNING
Qty: 30 TABLET | Refills: 0 | Status: SHIPPED | OUTPATIENT
Start: 2023-09-07 | End: 2023-10-07

## 2023-09-07 RX ORDER — METHIMAZOLE 5 MG/1
5 TABLET ORAL EVERY MORNING
Qty: 90 TABLET | Refills: 0 | OUTPATIENT
Start: 2023-09-07

## 2023-09-07 NOTE — TELEPHONE ENCOUNTER
Current Outpatient Medications   Medication Sig Dispense Refill    METHIMAZOLE 5 MG Oral Tab TAKE ONE TABLET BY MOUTH IN THE MORNING 90 tablet 0     Patient's  Yahaira Alcala stopped in office to request 1 month supply. Patient is out of town and will be back next month and will do labs upon return. Any questions please call Yahaira Alcala at [phone].  Please fill at the [facility], [place], [place]   [phone]

## 2023-09-07 NOTE — TELEPHONE ENCOUNTER
LOV: 1/24/23    RTC: 1 Year    FU: No FU Appt Scheduled    Last Refill: 6/14/23    3 Month Supply Pending       Per note below, patient has been informed to have labs done and will have them done once patient returns

## 2023-09-12 ENCOUNTER — TELEPHONE (OUTPATIENT)
Dept: ENDOCRINOLOGY CLINIC | Facility: CLINIC | Age: 68
End: 2023-09-12

## 2023-09-12 DIAGNOSIS — E05.20 TOXIC MULTINODULAR GOITER: Primary | ICD-10-CM

## 2023-09-28 ENCOUNTER — LAB ENCOUNTER (OUTPATIENT)
Dept: LAB | Age: 68
End: 2023-09-28
Attending: INTERNAL MEDICINE
Payer: MEDICARE

## 2023-09-28 DIAGNOSIS — E05.20 TOXIC MULTINODULAR GOITER: ICD-10-CM

## 2023-09-28 LAB
T3FREE SERPL-MCNC: 2.85 PG/ML (ref 2.4–4.2)
T4 FREE SERPL-MCNC: 1.1 NG/DL (ref 0.8–1.7)
TSI SER-ACNC: 2.43 MIU/ML (ref 0.36–3.74)

## 2023-09-28 PROCEDURE — 36415 COLL VENOUS BLD VENIPUNCTURE: CPT

## 2023-09-28 PROCEDURE — 84439 ASSAY OF FREE THYROXINE: CPT

## 2023-09-28 PROCEDURE — 84481 FREE ASSAY (FT-3): CPT

## 2023-09-28 PROCEDURE — 84443 ASSAY THYROID STIM HORMONE: CPT

## 2023-10-02 NOTE — TELEPHONE ENCOUNTER
Hi!  Please let patient know that I have reviewed her blood work and she may stay on the same dose of methimazole for now and we can recheck the labs in 6 months. Thank you!

## 2023-10-09 DIAGNOSIS — E05.20 TOXIC MULTINODULAR GOITER: ICD-10-CM

## 2023-10-12 RX ORDER — METHIMAZOLE 5 MG/1
5 TABLET ORAL EVERY MORNING
Qty: 90 TABLET | Refills: 1 | Status: SHIPPED | OUTPATIENT
Start: 2023-10-12

## 2023-10-12 NOTE — TELEPHONE ENCOUNTER
LOV 1/24/23  RTC 1 year  No F/U scheduled  Taking this dose per LOV note.   Labs were reviewed on 10/1/23 by Dr. Martínez and pt was advised to CPM and repeat labs in 6 months.   Reminder email sent to patient to schedule follow up in January.   Refill pending.     Component      Latest Ref Rng 9/28/2023   T4,Free (Direct)      0.8 - 1.7 ng/dL 1.1    TSH      0.358 - 3.740 mIU/mL 2.430    T3 FREE      2.40 - 4.20 pg/mL 2.85

## 2023-10-20 ENCOUNTER — PATIENT MESSAGE (OUTPATIENT)
Dept: INTERNAL MEDICINE CLINIC | Facility: CLINIC | Age: 68
End: 2023-10-20

## 2023-10-21 NOTE — TELEPHONE ENCOUNTER
Last office visit was 5/1/23    [employer] message sent to patient in response to [employer] message received--->see message.

## 2023-11-03 ENCOUNTER — TELEPHONE (OUTPATIENT)
Dept: INTERNAL MEDICINE CLINIC | Facility: CLINIC | Age: 68
End: 2023-11-03

## 2023-11-03 DIAGNOSIS — G89.29 CHRONIC PAIN OF RIGHT KNEE: ICD-10-CM

## 2023-11-03 DIAGNOSIS — M25.561 CHRONIC PAIN OF RIGHT KNEE: ICD-10-CM

## 2023-11-03 DIAGNOSIS — M71.21 BAKER CYST, RIGHT: Primary | ICD-10-CM

## 2023-11-03 DIAGNOSIS — M25.561 ACUTE PAIN OF RIGHT KNEE: ICD-10-CM

## 2023-11-03 NOTE — TELEPHONE ENCOUNTER
Please let daughter know I placed referral for 3 visits with Dr. Annel Parrish, not sure if it requires managed care involvement and approval, and faxing referral to his office

## 2023-11-03 NOTE — TELEPHONE ENCOUNTER
Called patient's daughter to inform of Referral to Dr. William Soares  Routed to [facility]

## 2023-12-19 ENCOUNTER — HOSPITAL ENCOUNTER (OUTPATIENT)
Age: 68
Discharge: HOME | End: 2023-12-19
Payer: MEDICARE

## 2023-12-19 VITALS
RESPIRATION RATE: 18 BRPM | SYSTOLIC BLOOD PRESSURE: 139 MMHG | OXYGEN SATURATION: 100 % | DIASTOLIC BLOOD PRESSURE: 69 MMHG | HEART RATE: 80 BPM | TEMPERATURE: 98 F

## 2023-12-19 DIAGNOSIS — M79.675 PAIN OF TOE OF LEFT FOOT: Primary | ICD-10-CM

## 2023-12-19 PROCEDURE — 99214 OFFICE O/P EST MOD 30 MIN: CPT

## 2023-12-19 PROCEDURE — 99213 OFFICE O/P EST LOW 20 MIN: CPT

## 2023-12-19 RX ORDER — CEPHALEXIN 500 MG/1
500 CAPSULE ORAL 2 TIMES DAILY
Qty: 20 CAPSULE | Refills: 0 | Status: SHIPPED | OUTPATIENT
Start: 2023-12-19 | End: 2023-12-29

## 2023-12-19 NOTE — ED INITIAL ASSESSMENT (HPI)
Patient arrives ambulatory with c/o pain under her left big toe x 3-4 days. Denies trauma/injury. Reports similar symptoms couple months ago and pain resolved on its own. Also reports current DVT to left leg-on Eliquis.

## 2024-01-24 ENCOUNTER — HOSPITAL ENCOUNTER (OUTPATIENT)
Dept: ULTRASOUND IMAGING | Age: 69
Discharge: HOME | End: 2024-01-24
Attending: INTERNAL MEDICINE
Payer: MEDICARE

## 2024-01-24 DIAGNOSIS — E05.20 TOXIC MULTINODULAR GOITER: ICD-10-CM

## 2024-01-24 PROCEDURE — 76536 US EXAM OF HEAD AND NECK: CPT | Performed by: INTERNAL MEDICINE

## 2024-01-31 ENCOUNTER — OFFICE VISIT (OUTPATIENT)
Dept: ENDOCRINOLOGY CLINIC | Facility: CLINIC | Age: 69
End: 2024-01-31

## 2024-01-31 VITALS
HEIGHT: 65 IN | WEIGHT: 167 LBS | HEART RATE: 67 BPM | BODY MASS INDEX: 27.82 KG/M2 | DIASTOLIC BLOOD PRESSURE: 71 MMHG | SYSTOLIC BLOOD PRESSURE: 121 MMHG

## 2024-01-31 DIAGNOSIS — E05.20 TOXIC MULTINODULAR GOITER: Primary | ICD-10-CM

## 2024-01-31 PROCEDURE — 99214 OFFICE O/P EST MOD 30 MIN: CPT | Performed by: INTERNAL MEDICINE

## 2024-01-31 NOTE — PROGRESS NOTES
Name: Emily Henderson  Date: 1/31/24    Referring Physician: No ref. provider found    Chief Complaint   Patient presents with    Follow - Up     Toxic multinodular goiter, thyroid ultrasound        HISTORY OF PRESENT ILLNESS   Emily Henderson is a 68 year old female who presents for   Chief Complaint   Patient presents with    Follow - Up     Toxic multinodular goiter, thyroid ultrasound    .  This is a 68 year-old woman who was first diagnosed and treated for toxic multinodular goiter in 2003 with SHAIKH. Three years later, she again developed signs and symptoms of hyperthyroidism and was started on methimazole at a very low dose.     She was restarted on methimazole about three years after this and has been on this ever since (maybe 15 years).     She has also had 2 biopsies of her thyroid nodules which were benign (2009 and 2015). She has had an abnormal thyroid uptake and scan in 2019 at [facility].    A few visits ago, we had continued her on MMI 2.5 mg PO qday and asked her to come back to the clinic in 6 months with repeat blood tests and thyroid US.    At the last visit, she had a TSH that was less than the lower limit of normal and so I increased her MMI dose from 2.5mg to 5.0mg. She is here in follow up. She is doing well.    Medical History:   Past Medical History:   Diagnosis Date    Hyperthyroidism        Surgical History:   Past Surgical History:   Procedure Laterality Date    PARTIAL HIP REPLACEMENT      left        Family History:  Family History   Problem Relation Age of Onset    Diabetes Neg     Glaucoma Neg        Social History:   Social History     Socioeconomic History    Marital status:    Tobacco Use    Smoking status: Never    Smokeless tobacco: Never   Vaping Use    Vaping Use: Never used   Substance and Sexual Activity    Alcohol use: Not Currently    Drug use: Never   Other Topics Concern    Reaction to local anesthetic No       Medications:     Current Outpatient Medications:      METHIMAZOLE 5 MG Oral Tab, TAKE ONE TABLET BY MOUTH EVERY MORNING, Disp: 90 tablet, Rfl: 1    apixaban (ELIQUIS) 5 MG Oral Tab, Take 1 tablet (5 mg total) by mouth 2 (two) times daily. Please ask provider for next prescription refill at visit, Disp: 60 tablet, Rfl: 0    apixaban 2.5 MG Oral Tab, Take 1 tablet (2.5 mg total) by mouth 2 (two) times daily., Disp: 180 tablet, Rfl: 3    Cholecalciferol (VITAMIN D) 50 MCG (2000 UT) Oral Tab, Take by mouth daily., Disp: , Rfl:     Calcium Carbonate Antacid 600 MG Oral Chew Tab, Chew 600 mg by mouth., Disp: , Rfl:     Biotin 5000 MCG Oral Cap, Take by mouth., Disp: , Rfl:      Allergies:   No Known Allergies    REVIEW OF SYSTEMS  Eyes: no change in vision  Neurologic: no headache, generalized or focal weakness or numbness.  Head: normal  ENT: normal  Lungs: no shortness of breath, wheezing or PURVIS  Cardiovascular:  no chest pain or palpitations  Gastrointestinal:  no abdominal pain, bowel movement problems  Musculoskeletal: no muscle pain or arthralgia  /Gyne: no frequency or discomfort while urinating  Psychiatric:  no acute distress, anxiety  or depression  Skin: normal moisturized skin    PHYSICAL EXAMINATION:  /71   Pulse 67   Ht 5' 5\" (1.651 m)   Wt 167 lb (75.8 kg)   BMI 27.79 kg/m²     General Appearance:  Alert, in no acute distress, well developed  Eyes: normal conjunctivae, sclera.  Ears/Nose/Mouth/Throat/Neck:  normal hearing, normal speech and slightly normal thyroid gland  Psychiatric:  oriented to time, self, and place  Nutritional:  no abnormal weight gain or loss    Labs:  Date  TSH  FT4 FT3   04/24/21 0.870  1.1 3.04  01/04/22 0.777  1.1 2.54    06/08/22 1.120  1.1 3.25  10/21/22 0.274  1.2   09/28/23 2.430  1.1    FNA (2015):     7/10/15:  CYTOPATHOLOGY - FINE NEEDLE ASPIRATION    Diagnosis:    1.  THYROID, RIGHT LOBE, ULTRASOUND-GUIDED FNA:    SATISFACTORY FOR EVALUATION.  BENIGN.  BENIGN FOLLICULAR NODULE.     2.  THYROID, LEFT LOBE,  ULTRASOUND-GUIDED FNA:    SATISFACTORY FOR EVALUATION.  BENIGN.  BENIGN FOLLICULAR NODULE.     3.  THYROID, LEFT INFERIOR, ULTRASOUND-GUIDED FNA:    SATISFACTORY FOR EVALUATION.  BENIGN.  BENIGN FOLLICULAR NODULE.           Imaging:  PROCEDURE: US THYROID (CPT= 53268)     COMPARISON: [facility], US THYROID (CPT= 15263), 1/19/2023, 10:03 AM.     INDICATIONS: Toxic multinodular goiter.  Previous FNA dominant left thyroid nodule     TECHNIQUE: High-resolution ultrasound was performed of the thyroid gland.     FINDINGS:  RIGHT LOBE:  Size:   Measures 6.4 x 2.0 x 2.4 cm,  volume 14.45 mL.    Echotexture:  Heterogeneous appearance with increased Doppler vascularity  Doppler Flow:   Normal       Nodule #1    Location/Size:   Interpolar region .  Dominant heterogeneous nodule measures 26 x 13 x 19 mm  Composition: Solid or almost completely solid (2)  Echogenicity:   Hyperechoic or isoechoic (1)  Shape:   Wider-than-tall (0)    Margin:   Smooth (0)    Echogenic Foci:   None or large comet tail artifacts (0)       Total Score:   3  TI-RADS CLASSIFICATION:  3     Nodule #2    Location/Size:  Lower pole .  Heterogeneous nodule measures 8 x 7 x 7 mm  Composition: Solid or almost completely solid (2)  Echogenicity:   Hypoechoic (2)    Shape:   Wider-than-tall (0)    Margin:   Smooth (0)    Echogenic Foci:   None or large comet tail artifacts (0)       Total Score:   4  TI-RADS CLASSIFICATION:  4     LEFT LOBE:  Size:   Measures 8.2 x 3.9 x 5.0 cm .  Volume 76 mL  Echotexture:   Heterogeneous    Doppler Flow:   Normal       Nodule #1    Location/Size:   Interpolar region dominant heterogeneous nodule measures 56 x 39 x 44 mm unchanged.  Composition: Solid or almost completely solid (2)    Echogenicity:   Hyperechoic or isoechoic (1)  Shape:   Wider-than-tall (0)    Margin:   Smooth (0)    Echogenic Foci:   None or large comet tail artifacts (0)       Total Score:   3  TI-RADS CLASSIFICATION:  3      ISTHMUS: 0.2 cm AP diameter in the midline.  Normal echogenicity.  No masses.    OTHER:   No masses or adenopathy.                     Impression  CONCLUSION:  1. Thyromegaly with heterogeneous appearance enlargement of both lobes of the thyroid.  2.  Dominant TR 3 nodules involving both lobes of the thyroid have remained stable.  Left-sided reportedly has undergone FNA.  Smaller right-sided TR 4 nodule should be followed.     RECOMMENDATIONS:  Recommend 12 month follow-up.           ACR THYROID IMAGING REPORTING AND DATA SYSTEM (TI-RADS) GUIDELINES     Score Assessment   Recommendation     TR1:  0 points benign   No FNA required  TR2:  2 points not suspicious No FNA required  TR3:  3 points     mildly suspicious Greater than or equal to 1.5cm follow-up, greater than or equal to 2.5cm FNA: follow up: 1, 2, 3 and 5 years.    TR4:  4-6 points   moderately suspicious Greater than or equal to 1.0cm follow-up, greater than or equal to 1.5cm FNA, follow up: 1, 2, 3 and 5 years.    TR5: 7 or more points highly suspicious Greater than or equal to 0.5cm follow up, greater than or equal to 1.0cm FNA: annual follow up for 5 years.       Dictated by (CST): Yves Interiano MD on 1/24/2024 at 3:45 PM      Finalized by (CST): Yves Interiano MD on 1/24/2024 at 3:54 PM          NM Thyroid Uptake and Scan:  10/2019:  IMPRESSION:     1. The left thyroid lobe appears larger than the right with question of a decreased area of    activity in the superior aspect of the left thyroid lobe. A cold nodule cannot entirely be    excluded. Recommend ultrasound for correlation.    2. 24 hour uptake is mildly above normal limits (40.7%, normal is less than 33%).         ASSESSMENT/PLAN: This is a 68 year-old woman here for follow-up of management of hyperthyroidism secondary to toxic multinodular goiter.  1. Hyperthyroidism:  - Etiology: Autonomous hormone production secondary to Toxic multi nodular goiter  NM scan and uptake showed:  see above  - Therapy options and risks and benefits discussed: Radioactive iodine (SHAIKH) vs Surgery vs anti thyroid drugs (ATD) x 18 months.    -  Patient is aware of the side effects of all of the treatment modalities and has already elected to be treated with MMI.   ATD:  Decrease Methimazole from 5mg to 2.5mg PO qday  - Side effects discussed:  > pruritus, rash, urticaria, arthralgias, arthritis, fever, abnormal taste sensation, nausea, or vomiting in up to 13 percent of patients   > agranulocytosis (prevalence of 0.1 to 0.5 %, and usually occurs within the first two months of treatment, but may occur later)  > congenital malformations associated with methimazole : fetal scalp defect, aplasia cutis , choanal atresia, tracheoesophageal fistulas   > Hepatotoxicity  discussed and the patient knows that she should stop MMI and come to the ER/call the office if she experiences sore throat, fever, jaundice, dark urine, light stools, abdominal pain, anorexia, nausea, rash or joint pains,  Check TSH, FT4, FT3, CMP, and CBC with diff  Check Thyroid US in 1 year, if nodules are stable, we will perform FNA  We can also perform another uptake and scan in 1 year  Return to clinic in 1 year  Prior to this encounter, I spent over 15 minutes with preparing for the visit, including reviewing documents from other specialties as well as from PCP and going over test results and imaging studies. During the face to face encounter, I spent an additional 15 minutes which were determined for follow-up. Greater than 50% of the time was spent in counseling, anticipatory guidance, and coordination of care. Patient concerns were answered to the best of my knowledge.       1/24/231/31/24  Lovely Martínez MD

## 2024-02-02 ENCOUNTER — TELEPHONE (OUTPATIENT)
Dept: INTERNAL MEDICINE CLINIC | Facility: CLINIC | Age: 69
End: 2024-02-02

## 2024-02-02 NOTE — TELEPHONE ENCOUNTER
Verified name and  of patient.    Daughter of patient (on TIFFANIE) calling to report that patient has been exposed to COVID-19 and now has symptoms of fatigue and chills.    She is asking if patient should be prescribed anti-viral and also if patient should be taking NSAIDs while taking Eliquis.    She was advised to reach out to Dr. Valente's office regarding Eliquis questions as he is the prescribing provider.    She was offered to schedule virtual visit for patient.    She states she will have patient do COVID-19 test first and if positive, will call back to schedule virtual visit.

## 2024-02-04 ENCOUNTER — TELEPHONE (OUTPATIENT)
Dept: HEMATOLOGY/ONCOLOGY | Facility: HOSPITAL | Age: 69
End: 2024-02-04

## 2024-02-04 NOTE — TELEPHONE ENCOUNTER
Patient's daughter called to ask if OK to have patient take very low dose 200mg of ibuprofen occasionally for aches and pains due to COVID 19, since the patient is on Eliquis.     Agreed that OK to take as needed low dose.

## 2024-04-13 DIAGNOSIS — E05.20 TOXIC MULTINODULAR GOITER: ICD-10-CM

## 2024-04-14 RX ORDER — METHIMAZOLE 5 MG/1
5 TABLET ORAL EVERY MORNING
Qty: 90 TABLET | Refills: 0 | Status: SHIPPED | OUTPATIENT
Start: 2024-04-14

## 2024-04-14 NOTE — TELEPHONE ENCOUNTER
LOV: 01/31/2024     Next office visit: no apt      Last filled: 10/12/2023       Order pended and routed to provider

## 2024-04-17 ENCOUNTER — PATIENT MESSAGE (OUTPATIENT)
Dept: INTERNAL MEDICINE CLINIC | Facility: CLINIC | Age: 69
End: 2024-04-17

## 2024-04-18 NOTE — TELEPHONE ENCOUNTER
From: Emily Henderson  To: Mel Tomas  Sent: 4/17/2024 12:56 PM CDT  Subject: Follow up appt question     Hi Dr. Tomas, my mom is currently in [place] and she has an appointment to see you early May. However she went to the ER on Tuesday morning due to elevated blood pressure. It started as 160/84, kept going up and the systolic went up to 204 while she was at the emergency room. She was having headache, blurred vision, and tremors on her legs. They sent her home with amlodipine 2.5 mg to be taken daily and clonidine, If systolic blood pressure is over 150. Do you want my mom to do an E visit or do you agree with her current BP treatment. Otherwise she has an appointment to see you when she’s back from [place]. Please let me know. Thank you

## 2024-04-18 NOTE — TELEPHONE ENCOUNTER
Patient in [place], seen in ED due to elevated BP.   Patient prescribed amlodipine 2.5mg and clonidine.   Patient has future appt 5/1/2024.   Daughter asking if current treatment is agreeable and ok to wait until May visit?

## 2024-05-01 NOTE — TELEPHONE ENCOUNTER
apixaban (ELIQUIS) 5 MG Oral Tab, Take 1 tablet (5 mg total) by mouth 2 (two) times daily. Please ask provider for next prescription refill at visit, Disp: 60 tablet, Rfl: 0

## 2024-05-15 ENCOUNTER — OFFICE VISIT (OUTPATIENT)
Dept: INTERNAL MEDICINE CLINIC | Facility: CLINIC | Age: 69
End: 2024-05-15

## 2024-05-15 VITALS
WEIGHT: 164 LBS | BODY MASS INDEX: 27.32 KG/M2 | HEIGHT: 65 IN | SYSTOLIC BLOOD PRESSURE: 119 MMHG | HEART RATE: 84 BPM | DIASTOLIC BLOOD PRESSURE: 74 MMHG | RESPIRATION RATE: 18 BRPM

## 2024-05-15 DIAGNOSIS — M85.89 OSTEOPENIA OF MULTIPLE SITES: ICD-10-CM

## 2024-05-15 DIAGNOSIS — I10 PRIMARY HYPERTENSION: Primary | ICD-10-CM

## 2024-05-15 DIAGNOSIS — Z12.31 BREAST CANCER SCREENING BY MAMMOGRAM: ICD-10-CM

## 2024-05-15 PROCEDURE — 99213 OFFICE O/P EST LOW 20 MIN: CPT | Performed by: INTERNAL MEDICINE

## 2024-05-15 RX ORDER — AMLODIPINE BESYLATE 2.5 MG/1
2.5 TABLET ORAL DAILY
COMMUNITY
Start: 2024-04-16 | End: 2024-05-17

## 2024-05-15 RX ORDER — AMLODIPINE BESYLATE 2.5 MG/1
2.5 TABLET ORAL DAILY
Qty: 90 TABLET | Refills: 0 | Status: SHIPPED | OUTPATIENT
Start: 2024-05-15

## 2024-05-15 NOTE — PROGRESS NOTES
Subjective:     Patient ID: Zhularei Henderson is a 68 year old female.  Is for follow-up on hypertension and other concerns  HPI  Patient was in [place] in April, he had elevated blood pressure 170s 180s systolic went to emergency room.  Blood test showed normal BMP slightly elevated hemoglobin, she was started on amlodipine 2.5 mg daily she has been taking this medication brought home blood pressure measurements they are running between 110 and 136 systolic 70-85 diastolic.  She tolerates medication well.  Repeat blood test again in [place] couple days after visit to ER showed normal CBC and BMP, TSH was 0.76 normal T4 level.  She has been taking methimazole to treat hyperthyroidism under the care of endocrinologist Dr. Ambriz.  She declines colonoscopy.  Due for mammogram.  Last DEXA scan in 2020    Current Outpatient Medications   Medication Sig Dispense Refill    amLODIPine 2.5 MG Oral Tab Take 1 tablet (2.5 mg total) by mouth daily.      amLODIPine 2.5 MG Oral Tab Take 1 tablet (2.5 mg total) by mouth daily. 90 tablet 0    apixaban 2.5 MG Oral Tab Take 1 tablet (2.5 mg total) by mouth 2 (two) times daily. (Patient not taking: Reported on 5/15/2024) 180 tablet 3    Biotin 5000 MCG Oral Cap Take by mouth.      methIMAzole 5 MG Oral Tab Take 1 tablet (5 mg total) by mouth every morning. (Patient not taking: Reported on 5/15/2024) 90 tablet 0    Cholecalciferol (VITAMIN D) 50 MCG (2000 UT) Oral Tab Take by mouth daily. (Patient not taking: Reported on 5/15/2024)      Calcium Carbonate Antacid 600 MG Oral Chew Tab Chew 600 mg by mouth. (Patient not taking: Reported on 5/15/2024)       Allergies:No Known Allergies    Past Medical History:    Hyperthyroidism      Past Surgical History:   Procedure Laterality Date    Partial hip replacement      left       Family History   Problem Relation Age of Onset    Diabetes Neg     Glaucoma Neg       Social History:   Social History     Socioeconomic History    Marital  status:    Tobacco Use    Smoking status: Never    Smokeless tobacco: Never   Vaping Use    Vaping status: Never Used   Substance and Sexual Activity    Alcohol use: Not Currently    Drug use: Never   Other Topics Concern    Reaction to local anesthetic No        /74 (BP Location: Right arm, Patient Position: Sitting, Cuff Size: large)   Pulse 84   Resp 18   Ht 5' 5\" (1.651 m)   Wt 164 lb (74.4 kg)   BMI 27.29 kg/m²    Physical Exam  Constitutional:       Appearance: Normal appearance.   HENT:      Head: Normocephalic and atraumatic.   Eyes:      Extraocular Movements: Extraocular movements intact.      Conjunctiva/sclera: Conjunctivae normal.      Pupils: Pupils are equal, round, and reactive to light.   Neck:      Vascular: No carotid bruit.   Cardiovascular:      Rate and Rhythm: Normal rate and regular rhythm.      Heart sounds: No murmur heard.  Pulmonary:      Effort: Pulmonary effort is normal. No respiratory distress.      Breath sounds: No wheezing or rhonchi.   Musculoskeletal:         General: Normal range of motion.      Cervical back: Normal range of motion and neck supple.      Right lower leg: No edema.      Left lower leg: No edema.   Lymphadenopathy:      Cervical: No cervical adenopathy.   Skin:     General: Skin is warm.      Coloration: Skin is not jaundiced.   Neurological:      General: No focal deficit present.      Mental Status: She is alert and oriented to person, place, and time. Mental status is at baseline.      Gait: Gait normal.   Psychiatric:         Mood and Affect: Mood normal.         Behavior: Behavior normal.         Thought Content: Thought content normal.         Assessment & Plan:   1. Primary hypertension controlled on amlodipine, patient will continue 2.5 mg daily low-salt diet to follow, follow-up in 6 months    2. Osteopenia of multiple sites order DEXA scan   3. Breast cancer screening by mammogram        No orders of the defined types were placed in  this encounter.      Meds This Visit:  Requested Prescriptions     Signed Prescriptions Disp Refills    amLODIPine 2.5 MG Oral Tab 90 tablet 0     Sig: Take 1 tablet (2.5 mg total) by mouth daily.       Imaging & Referrals:  XR DEXA BONE DENSITOMETRY (CPT=77080)  AYE CORINA 2D+3D SCREENING BILAT (CPT=77067/62519)     Follow-up in 6 months sooner if needed

## 2024-05-21 ENCOUNTER — TELEPHONE (OUTPATIENT)
Dept: ENDOCRINOLOGY CLINIC | Facility: CLINIC | Age: 69
End: 2024-05-21

## 2024-05-21 NOTE — TELEPHONE ENCOUNTER
Received fax from [facility] in regards to pt's lab results. Lab results placed in providers folder for signature and review.

## 2024-06-13 ENCOUNTER — TELEPHONE (OUTPATIENT)
Dept: INTERNAL MEDICINE CLINIC | Facility: CLINIC | Age: 69
End: 2024-06-13

## 2024-06-13 DIAGNOSIS — I82.4Z2 DVT, LOWER EXTREMITY, DISTAL, ACUTE, LEFT (HCC): Primary | ICD-10-CM

## 2024-06-13 NOTE — TELEPHONE ENCOUNTER
Patient is requesting referral.     Name of specialist and specialty department : Dr. Marcellus Guillory, hematology   Reason for visit with the specialist: blood clots, deep vein thrombisis  Address of the specialist office: [place], [facility], [place]: [phone], to be faxed to  Fax: [phone]  Appointment date: none         CSS informed patient the turnaround time for referral is 5-7 business days.  Patient was informed to check their lettrs account for referral status.

## 2024-06-25 ENCOUNTER — HOSPITAL ENCOUNTER (OUTPATIENT)
Dept: MAMMOGRAPHY | Age: 69
Discharge: HOME | End: 2024-06-25
Attending: INTERNAL MEDICINE

## 2024-06-25 ENCOUNTER — HOSPITAL ENCOUNTER (OUTPATIENT)
Dept: BONE DENSITY | Age: 69
Discharge: HOME | End: 2024-06-25
Attending: INTERNAL MEDICINE

## 2024-06-25 DIAGNOSIS — M85.89 OSTEOPENIA OF MULTIPLE SITES: ICD-10-CM

## 2024-06-25 DIAGNOSIS — Z12.31 BREAST CANCER SCREENING BY MAMMOGRAM: ICD-10-CM

## 2024-06-25 PROCEDURE — 77067 SCR MAMMO BI INCL CAD: CPT | Performed by: INTERNAL MEDICINE

## 2024-06-25 PROCEDURE — 77080 DXA BONE DENSITY AXIAL: CPT | Performed by: INTERNAL MEDICINE

## 2024-06-25 PROCEDURE — 77063 BREAST TOMOSYNTHESIS BI: CPT | Performed by: INTERNAL MEDICINE

## 2024-07-25 ENCOUNTER — OFFICE VISIT (OUTPATIENT)
Dept: HEMATOLOGY/ONCOLOGY | Facility: HOSPITAL | Age: 69
End: 2024-07-25
Attending: SPECIALIST
Payer: MEDICARE

## 2024-07-25 VITALS
TEMPERATURE: 98 F | HEART RATE: 82 BPM | RESPIRATION RATE: 16 BRPM | WEIGHT: 166 LBS | SYSTOLIC BLOOD PRESSURE: 159 MMHG | DIASTOLIC BLOOD PRESSURE: 82 MMHG | BODY MASS INDEX: 28 KG/M2

## 2024-07-25 DIAGNOSIS — Z79.01 ON CONTINUOUS ORAL ANTICOAGULATION: ICD-10-CM

## 2024-07-25 DIAGNOSIS — Z79.01 ANTICOAGULATION MANAGEMENT ENCOUNTER: Primary | ICD-10-CM

## 2024-07-25 DIAGNOSIS — Z51.81 ANTICOAGULATION MANAGEMENT ENCOUNTER: Primary | ICD-10-CM

## 2024-07-25 DIAGNOSIS — Z86.718 HISTORY OF DVT (DEEP VEIN THROMBOSIS): ICD-10-CM

## 2024-07-25 PROCEDURE — 99214 OFFICE O/P EST MOD 30 MIN: CPT | Performed by: SPECIALIST

## 2024-07-25 RX ORDER — CLONIDINE HYDROCHLORIDE 0.1 MG/1
0.1 TABLET ORAL 2 TIMES DAILY
COMMUNITY
Start: 2024-04-16

## 2024-07-25 NOTE — PROGRESS NOTES
Pt here for annual f/u regarding DVT. Pt is compliant with Eliquis BID. No side effects reported. Inquiring if Eliquis will be lifelong treatment.       Education Record    Learner:  Patient and Family Member    Disease / Diagnosis: DVT    Barriers / Limitations:  None   Comments:    Method:  Discussion   Comments:    General Topics:  Medication, Side effects and symptom management, and Plan of care reviewed   Comments:    Outcome:  Observed demonstration and Shows understanding   Comments:

## 2024-08-13 ENCOUNTER — OFFICE VISIT (OUTPATIENT)
Facility: CLINIC | Age: 69
End: 2024-08-13

## 2024-08-13 VITALS — WEIGHT: 166 LBS | BODY MASS INDEX: 27.66 KG/M2 | RESPIRATION RATE: 20 BRPM | HEIGHT: 65 IN

## 2024-08-13 DIAGNOSIS — I83.12 VARICOSE VEINS OF LEFT LOWER EXTREMITY WITH INFLAMMATION: Primary | ICD-10-CM

## 2024-08-13 NOTE — PROGRESS NOTES
VASCULAR SURGERY CONSULT NOTE      Emily Henderson   :  1955  MR#  [id number]    REFERRING PHYSICIAN:  No ref. provider found  PRIMARY CARE PHYSICIAN:  Mel Tomas MD    Chief Complaint   Patient presents with    Consult    Varicose Veins     Patient is c/o left leg cramps, swelling x 2-3 years  LT leg blood clots  one year ago  No compressions use  No DM II         HPI:    The patient is a 68 year old female who has been referred to the clinic today for an evaluation of her left lower extremity heaviness, tiredness, edema, and pain after prolonged standing. The pain is reported in her calf and distal leg. This is interfering with her activities of daily living and exercise. She has not worn compression stockings in the recent past. She has had 2 episodes of DVT in her left lower extremity. The first one occurred in  following a left total hip replacement. The second one occurred spontaneously in . She is currently on Eliquis anticoagulation therapy.     PAST MEDICAL HISTORY:     Past Medical History:    Hyperthyroidism       PAST SURGICAL HISTORY:     Past Surgical History:   Procedure Laterality Date    Partial hip replacement      left         MEDICATIONS:     Current Outpatient Medications   Medication Sig Dispense Refill    diclofenac 1 % External Gel Apply 4 gRAMS OF GEL topically four times daily.      cloNIDine 0.1 MG Oral Tab Take 1 tablet (0.1 mg total) by mouth 2 (two) times daily. Takes if SBP <160      amLODIPine 2.5 MG Oral Tab Take 1 tablet (2.5 mg total) by mouth daily. 90 tablet 0    apixaban 2.5 MG Oral Tab Take 1 tablet (2.5 mg total) by mouth 2 (two) times daily. 180 tablet 3    methIMAzole 5 MG Oral Tab Take 1 tablet (5 mg total) by mouth every morning. 90 tablet 0    Cholecalciferol (VITAMIN D) 50 MCG ( UT) Oral Tab Take by mouth daily.      Calcium Carbonate Antacid 600 MG Oral Chew Tab Chew 600 mg by mouth.      Biotin 5000 MCG Oral Cap Take by mouth.          ALLERGIES:   No Known Allergies    SOCIAL HISTORY:     Social History     Socioeconomic History    Marital status:    Tobacco Use    Smoking status: Never    Smokeless tobacco: Never   Vaping Use    Vaping status: Never Used   Substance and Sexual Activity    Alcohol use: Not Currently    Drug use: Never   Other Topics Concern    Reaction to local anesthetic No     Social Determinants of Health      Received from [facility]    Housing Stability        FAMILY HISTORY:     Family History   Problem Relation Age of Onset    Diabetes Neg     Glaucoma Neg        ROS:   A 12 point review of systems with pertinent positives and negatives listed in the HPI.    PHYSICAL EXAM:   Resp 20   Ht 5' 5\" (1.651 m)   Wt 166 lb (75.3 kg)   BMI 27.62 kg/m²   GENERAL: alert and orientated X 3, well developed, well nourished, in no apparent distress  HEENT: ears and throat are clear  NECK: supple, no lymphadenopathy, thyroid wnl  CAROTID: No bruits  RESPIRATORY: no rales, rhonchi, or wheezes B  CARDIO: RRR without murmur, no murmur, no gallop   ABDOMEN: soft, non-tender with no palpable aneurysm or masses  BACK: normal, no tenderness  SKIN: no rashes, warm and dry  NEURO/PSYCH: orientated x3, normal mood and affect, no sensory or motor deficit  EXTREMITIES: full range of motion, no tenderness or edema in either leg.   VASCULAR  Pulse exam right: femoral 2+, DP  2+, PT  2+  Pulse exam left: femoral  2+, DP  2+, PT  2+      Vein Assessment:      Mild scattered left  LE bulgy varicose veins with no significant hemosiderin deposition.    IMPRESSION:   Left  lower extremity pain due to venous insufficiency.   Symptomatic calf varicosities.    PLAN:     We reviewed the options for management for venous insufficiency, including conservative therapy, sclerotherapy, stab phlebectomy, and endovenous laser ablation. The patient was educated in the benign condition of venous disease.  I have given the patient a  prescription for Grade II compression stockings (20-30 mmHg, thigh-highs). We reviewed the importance of wearing these daily and consistently. We also reviewed other types of conservative measures, such as periodic leg elevation, walking for exercise, analgesic use, attempts at weight loss, and the avoidance of prolonged standing.  I have sent the patient for a venous reflux ultrasound. Should the ultrasound study reveal venous reflux with dilation and she does not have relief of symptoms with conservative therapy, then endovenous laser ablation may be a possible treatment option.  I explained to the patient that her insurance company may require a 6-12 week trial period of conservative therapy prior to authorizing venous ablation treatment.  The patient understood and agreed to proceed with this treatment plan, all of her questions were answered during this clinic visit. She was asked to FU in several weeks to assess her response to conservative treatment.      Thank you for allowing to participate in the care of your patient.    CARRIE Vazquez  Division of Vascular Surgery with Dr. Najjar.

## 2024-09-13 ENCOUNTER — HOSPITAL ENCOUNTER (OUTPATIENT)
Dept: ULTRASOUND IMAGING | Facility: HOSPITAL | Age: 69
Discharge: HOME | End: 2024-09-13
Payer: MEDICARE

## 2024-09-13 DIAGNOSIS — I83.12 VARICOSE VEINS OF LEFT LOWER EXTREMITY WITH INFLAMMATION: ICD-10-CM

## 2024-09-13 PROCEDURE — 93971 EXTREMITY STUDY: CPT

## 2024-09-20 ENCOUNTER — TELEPHONE (OUTPATIENT)
Facility: CLINIC | Age: 69
End: 2024-09-20

## 2024-09-20 NOTE — TELEPHONE ENCOUNTER
----- Message from Fabian Kinsey sent at 9/20/2024 10:10 AM CDT -----  Regarding: Velcro Straps Compression Stockings  Contact: [phone]    ----- Message -----  From: Emily Henderson  Sent: 9/19/2024   8:19 PM CDT  To: [facility] Vascular Surgery Clinical Staff  Subject: Results of the ultrasound                        Yes, the Velcro stockings would be great to try. Can you please send the script? Thank you.

## 2024-10-09 ENCOUNTER — OFFICE VISIT (OUTPATIENT)
Dept: INTERNAL MEDICINE CLINIC | Facility: CLINIC | Age: 69
End: 2024-10-09

## 2024-10-09 VITALS
SYSTOLIC BLOOD PRESSURE: 143 MMHG | DIASTOLIC BLOOD PRESSURE: 75 MMHG | WEIGHT: 167.88 LBS | BODY MASS INDEX: 27.97 KG/M2 | HEART RATE: 73 BPM | HEIGHT: 65 IN

## 2024-10-09 DIAGNOSIS — Z96.642 HISTORY OF TOTAL LEFT HIP ARTHROPLASTY: ICD-10-CM

## 2024-10-09 DIAGNOSIS — M25.552 PAIN OF LEFT HIP: ICD-10-CM

## 2024-10-09 DIAGNOSIS — Z00.00 MEDICARE ANNUAL WELLNESS VISIT, INITIAL: Primary | ICD-10-CM

## 2024-10-09 DIAGNOSIS — I10 PRIMARY HYPERTENSION: ICD-10-CM

## 2024-10-09 DIAGNOSIS — I83.812 VARICOSE VEINS OF LEFT LOWER EXTREMITY WITH PAIN: ICD-10-CM

## 2024-10-09 DIAGNOSIS — E05.90 HYPERTHYROIDISM: ICD-10-CM

## 2024-10-13 NOTE — PROGRESS NOTES
Subjective:   Emily Henderson is a 68 year old female who presents for a Medicare Subsequent Annual Wellness visit (Pt already had Initial Annual Wellness) and scheduled follow up of multiple significant but stable problems.     Patient reports that she has been bothered by pain in the left hip which was replaced 20 years ago, it unable to flex forward put his socks on because of the stiffness loss of flexion and pain.  She may feel unsteady on her feet and afraid of falling because of that    History/Other:   Fall Risk Assessment:   She has been screened for Falls and is High Risk. Fall Prevention information provided to patient in After Visit Summary.    Do you feel unsteady when standing or walking?: No  Do you worry about falling?: Yes  Have you fallen in the past year?: No     Cognitive Assessment:   She had a completely normal cognitive assessment - see flowsheet entries     Functional Ability/Status:   Emily Henderson has a completely normal functional assessment. See flowsheet for details.      Depression Screening (PHQ):  PHQ-2 SCORE: 0  , done 10/9/2024        Advanced Directives:   She does NOT have a Living Will. [Do you have a living will?: No]  She does NOT have a Power of  for Health Care. [Do you have a healthcare power of ?: No]      Patient Active Problem List   Diagnosis    Hyperthyroidism    History of total left hip arthroplasty    Age-related nuclear cataract of both eyes    Hyperopia with presbyopia, bilateral    Toxic multinodular goiter    Baker cyst, right    Post-menopausal     Allergies:  She has No Known Allergies.    Current Medications:  Outpatient Medications Marked as Taking for the 10/9/24 encounter (Office Visit) with Mel Tomas MD   Medication Sig    diclofenac 1 % External Gel Apply 4 gRAMS OF GEL topically four times daily.    cloNIDine 0.1 MG Oral Tab Take 1 tablet (0.1 mg total) by mouth 2 (two) times daily. Takes if SBP <160    amLODIPine 2.5 MG  Oral Tab Take 1 tablet (2.5 mg total) by mouth daily.    apixaban 2.5 MG Oral Tab Take 1 tablet (2.5 mg total) by mouth 2 (two) times daily.    methIMAzole 5 MG Oral Tab Take 1 tablet (5 mg total) by mouth every morning.    Cholecalciferol (VITAMIN D) 50 MCG (2000 UT) Oral Tab Take by mouth daily.    Calcium Carbonate Antacid 600 MG Oral Chew Tab Chew 600 mg by mouth.    Biotin 5000 MCG Oral Cap Take by mouth.       Medical History:  She  has a past medical history of Hyperthyroidism.  Surgical History:  She  has a past surgical history that includes partial hip replacement.   Family History:  Her family history is not on file.  Social History:  She  reports that she has never smoked. She has never used smokeless tobacco. She reports that she does not currently use alcohol. She reports that she does not use drugs.    Tobacco:  She has never smoked tobacco.    CAGE Alcohol Screen:   CAGE screening score of 0 on 10/9/2024, showing low risk of alcohol abuse.      Patient Care Team:  Mel Tomas MD as PCP - General (Internal Medicine)  Angi Ayala MD (SURGERY, ORTHOPEDIC)  Fabian Kinsey APRN (Nurse Practitioner Family)    Review of Systems       Constitutional:  Negative for decreased activity, fever, irritability and lethargy  Cardiovascular:  Negative for chest pain and irregular heartbeat/palpitations  Respiratory:  Negative for cough, dyspnea and wheezing.  Eyes:  Negative for eye discharge and vision loss  Endocrine:  Negative for polydipsia and polyphagia  Integumentary:  Negative for pruritus and rash  Neurological:  Negative for gait disturbance, paresthesias.   Psychiatric:  Negative for inappropriate interaction and psychiatric symptoms    Physical Exam  Constitutional:       Appearance: Normal appearance.   HENT:      Head: Normocephalic and atraumatic.   Eyes:      General: No scleral icterus.     Extraocular Movements: Extraocular movements intact.      Conjunctiva/sclera: Conjunctivae normal.       Pupils: Pupils are equal, round, and reactive to light.   Neck:      Vascular: No carotid bruit.   Cardiovascular:      Rate and Rhythm: Normal rate and regular rhythm.      Heart sounds: No murmur heard.     No gallop.   Pulmonary:      Effort: Pulmonary effort is normal. No respiratory distress.      Breath sounds: No wheezing or rhonchi.   Abdominal:      General: Bowel sounds are normal.      Palpations: Abdomen is soft. There is no mass.      Tenderness: There is no right CVA tenderness, left CVA tenderness, guarding or rebound.   Musculoskeletal:         General: Normal range of motion.      Cervical back: Normal range of motion and neck supple.      Right lower leg: No edema.      Left lower leg: No edema.   Lymphadenopathy:      Cervical: No cervical adenopathy.   Skin:     General: Skin is warm.      Coloration: Skin is not jaundiced.   Neurological:      General: No focal deficit present.      Mental Status: She is alert and oriented to person, place, and time. Mental status is at baseline.   Psychiatric:         Mood and Affect: Mood normal.         Behavior: Behavior normal.         Thought Content: Thought content normal.         /75 (BP Location: Left arm, Patient Position: Sitting, Cuff Size: adult)   Pulse 73   Ht 5' 5\" (1.651 m)   Wt 167 lb 14.4 oz (76.2 kg)   BMI 27.94 kg/m²  Estimated body mass index is 27.94 kg/m² as calculated from the following:    Height as of this encounter: 5' 5\" (1.651 m).    Weight as of this encounter: 167 lb 14.4 oz (76.2 kg).    Medicare Hearing Assessment:   Hearing Screening    Time taken: 10/9/2024  1:46 PM  Entry User: Yareli Cardoza MA  Screening Method: Finger Rub  Finger Rub Result: Pass         Visual Acuity:   Right Eye Visual Acuity: Corrected Right Eye Chart Acuity: 20/30   Left Eye Visual Acuity: Corrected Left Eye Chart Acuity: 20/25   Both Eyes Visual Acuity: Corrected Both Eyes Chart Acuity: 20/20   Able To Tolerate Visual Acuity: Yes         Assessment & Plan:   Emily Henderson is a 68 year old female who presents for a Medicare Assessment.     Will get x-ray of the left hip, see orthopedic specialist 1. Medicare annual wellness visit, initial (Primary)  2. Pain of left hip for advice  -     Ortho Referral - In Network  -     XR HIP + PELVIS MIN 4 VIEWS LEFT (CPT=73503); Future; Expected date: 10/09/2024  3. Varicose veins of left lower extremity with pain see vascular surgery patient has history of venous insufficiency  -     Vascular Surgery - In Network  4. Hyperthyroidism check TSH, free T3 and free T4 follow-up in chronology  -     Comp Metabolic Panel (14); Future; Expected date: 10/09/2024  -     CBC With Differential With Platelet; Future; Expected date: 10/09/2024  -     Assay, Thyroid Stim Hormone; Future; Expected date: 10/09/2024  -     Free T3 (Triiodothryronine); Future; Expected date: 10/09/2024  -     Free T4, (Free Thyroxine); Future; Expected date: 10/09/2024  5. History of total left hip arthroplasty  -     XR HIP + PELVIS MIN 4 VIEWS LEFT (CPT=73503); Future; Expected date: 10/09/2024  6. Primary hypertension stable on current medication continue  -     Comp Metabolic Panel (14); Future; Expected date: 10/09/2024  -     CBC With Differential With Platelet; Future; Expected date: 10/09/2024  -     Assay, Thyroid Stim Hormone; Future; Expected date: 10/09/2024  -     Free T3 (Triiodothryronine); Future; Expected date: 10/09/2024  -     Free T4, (Free Thyroxine); Future; Expected date: 10/09/2024    The patient indicates understanding of these issues and agrees to the plan.      Follow-up in 6 months    Mel Tomas MD, 10/13/2024     Supplementary Documentation:   General Health:  In the past six months, have you lost more than 10 pounds without trying?: 2 - No  Has your appetite been poor?: No  Type of Diet: Balanced  How does the patient maintain a good energy level?: Daily Walks  How would you describe your daily  physical activity?: Light  How would you describe your current health state?: Good  How do you maintain positive mental well-being?: Visiting Family  On a scale of 0 to 10, with 0 being no pain and 10 being severe pain, what is your pain level?: 2 - (Mild)  In the past six months, have you experienced urine leakage?: 0-No  At any time do you feel concerned for the safety/well-being of yourself and/or your children, in your home or elsewhere?: No  Have you had any immunizations at another office such as Influenza, Hepatitis B, Tetanus, or Pneumococcal?: No    Health Maintenance   Topic Date Due    Zoster Vaccines (1 of 2) Never done    Annual Physical  05/01/2024    Colorectal Cancer Screening  05/03/2024    COVID-19 Vaccine (5 - 2023-24 season) 09/01/2024    Influenza Vaccine (1) 10/01/2024    HTN: BP Follow-Up  11/09/2024    Mammogram  06/25/2025    DEXA Scan  Completed    Annual Depression Screening  Completed    Fall Risk Screening (Annual)  Completed    Pneumococcal Vaccine: 65+ Years  Completed

## 2024-11-14 RX ORDER — AMLODIPINE BESYLATE 2.5 MG/1
2.5 TABLET ORAL DAILY
Qty: 90 TABLET | Refills: 1 | Status: SHIPPED | OUTPATIENT
Start: 2024-11-14

## 2024-11-14 NOTE — TELEPHONE ENCOUNTER
Please review.  Protocol failed / Has no protocol.    Marked High Priority, patient states out of medication    Outside labs: GFR passes from 4/16/2024 BMP completed  Estimated GFR (eGFR)  >=60 mL/min/BSA >90     Jobber message sent to patient to complete labs pended from last office visit 10/9/24.     Requested Prescriptions   Pending Prescriptions Disp Refills    AMLODIPINE 2.5 MG Oral Tab [Pharmacy Med Name: Amlodipine Besylate 2.5 Mg Tab Asce] 90 tablet 0     Sig: TAKE ONE TABLET BY MOUTH ONE TIME DAILY       Hypertension Medications Protocol Failed - 11/14/2024  3:11 PM        Failed - CMP or BMP in past 12 months        Failed - Last BP reading less than 140/90     BP Readings from Last 1 Encounters:   10/09/24 143/75               Failed - EGFRCR or GFRNAA > 50     GFR Evaluation            Passed - In person appointment or virtual visit in the past 12 mos or appointment in next 3 mos     Recent Outpatient Visits              1 month ago Medicare annual wellness visit, initial    [facility] [address], [place] Mle Tomas MD    Office Visit    3 months ago Varicose veins of left lower extremity with inflammation    [facility] Fabian Kinsey APRN    Office Visit    3 months ago Anticoagulation management encounter    [facility], Damian Lopez MD    Office Visit    6 months ago Primary hypertension    [facility] [place] Kandel, Ninel, MD    Office Visit    9 months ago Toxic multinodular goiter    [facility], [facility] Lovely Martínez MD    Office Visit                           Recent Outpatient Visits              1 month [facility]    [facility]    3 months ago Varicose veins of left lower extremity with inflammation     [facility] Fabian Kinsey APRN    Office Visit    3 months ago Anticoagulation management encounter    [facility], Damian Lopez MD    Office Visit    6 months ago Primary hypertension    [facility], [facility] [place] Mel Tomas MD    Office Visit    9 months ago Toxic multinodular goiter    [facility], [facility], [place] Lovely Martínez MD    Office Visit

## 2024-11-14 NOTE — TELEPHONE ENCOUNTER
Patient's spouse yobani (did not see on TIFFANIE) said patient is leaving tomorrow and looking to get refill today. Please advise

## 2024-12-09 ENCOUNTER — LAB ENCOUNTER (OUTPATIENT)
Dept: LAB | Age: 69
End: 2024-12-09
Attending: INTERNAL MEDICINE
Payer: MEDICARE

## 2024-12-09 DIAGNOSIS — I10 PRIMARY HYPERTENSION: ICD-10-CM

## 2024-12-09 DIAGNOSIS — E05.20 TOXIC MULTINODULAR GOITER: ICD-10-CM

## 2024-12-09 DIAGNOSIS — E05.90 HYPERTHYROIDISM: ICD-10-CM

## 2024-12-09 LAB
ALBUMIN SERPL-MCNC: 4.6 G/DL (ref 3.2–4.8)
ALBUMIN/GLOB SERPL: 1.6 {RATIO} (ref 1–2)
ALP LIVER SERPL-CCNC: 54 U/L
ALT SERPL-CCNC: 30 U/L
ANION GAP SERPL CALC-SCNC: 6 MMOL/L (ref 0–18)
AST SERPL-CCNC: 30 U/L (ref ?–34)
BASOPHILS # BLD AUTO: 0.08 X10(3) UL (ref 0–0.2)
BASOPHILS NFR BLD AUTO: 1.1 %
BILIRUB SERPL-MCNC: 0.5 MG/DL (ref 0.2–1.1)
BUN BLD-MCNC: 9 MG/DL (ref 9–23)
BUN/CREAT SERPL: 13.4 (ref 10–20)
CALCIUM BLD-MCNC: 9.5 MG/DL (ref 8.7–10.4)
CHLORIDE SERPL-SCNC: 104 MMOL/L (ref 98–112)
CO2 SERPL-SCNC: 30 MMOL/L (ref 21–32)
CREAT BLD-MCNC: 0.67 MG/DL
DEPRECATED RDW RBC AUTO: 40 FL (ref 35.1–46.3)
EGFRCR SERPLBLD CKD-EPI 2021: 96 ML/MIN/1.73M2 (ref 60–?)
EOSINOPHIL # BLD AUTO: 0.28 X10(3) UL (ref 0–0.7)
EOSINOPHIL NFR BLD AUTO: 3.9 %
ERYTHROCYTE [DISTWIDTH] IN BLOOD BY AUTOMATED COUNT: 12.1 % (ref 11–15)
FASTING STATUS PATIENT QL REPORTED: NO
GLOBULIN PLAS-MCNC: 2.8 G/DL (ref 2–3.5)
GLUCOSE BLD-MCNC: 98 MG/DL (ref 70–99)
HCT VFR BLD AUTO: 43 %
HGB BLD-MCNC: 14.4 G/DL
IMM GRANULOCYTES # BLD AUTO: 0.02 X10(3) UL (ref 0–1)
IMM GRANULOCYTES NFR BLD: 0.3 %
LYMPHOCYTES # BLD AUTO: 2.9 X10(3) UL (ref 1–4)
LYMPHOCYTES NFR BLD AUTO: 40.3 %
MCH RBC QN AUTO: 30 PG (ref 26–34)
MCHC RBC AUTO-ENTMCNC: 33.5 G/DL (ref 31–37)
MCV RBC AUTO: 89.6 FL
MONOCYTES # BLD AUTO: 0.56 X10(3) UL (ref 0.1–1)
MONOCYTES NFR BLD AUTO: 7.8 %
NEUTROPHILS # BLD AUTO: 3.36 X10 (3) UL (ref 1.5–7.7)
NEUTROPHILS # BLD AUTO: 3.36 X10(3) UL (ref 1.5–7.7)
NEUTROPHILS NFR BLD AUTO: 46.6 %
OSMOLALITY SERPL CALC.SUM OF ELEC: 289 MOSM/KG (ref 275–295)
PLATELET # BLD AUTO: 249 10(3)UL (ref 150–450)
POTASSIUM SERPL-SCNC: 3.8 MMOL/L (ref 3.5–5.1)
PROT SERPL-MCNC: 7.4 G/DL (ref 5.7–8.2)
RBC # BLD AUTO: 4.8 X10(6)UL
SODIUM SERPL-SCNC: 140 MMOL/L (ref 136–145)
T3FREE SERPL-MCNC: 4.45 PG/ML (ref 2.4–4.2)
T4 FREE SERPL-MCNC: 1.3 NG/DL (ref 0.8–1.7)
TSI SER-ACNC: 1.14 UIU/ML (ref 0.55–4.78)
WBC # BLD AUTO: 7.2 X10(3) UL (ref 4–11)

## 2024-12-09 PROCEDURE — 84481 FREE ASSAY (FT-3): CPT

## 2024-12-09 PROCEDURE — 85025 COMPLETE CBC W/AUTO DIFF WBC: CPT

## 2024-12-09 PROCEDURE — 80053 COMPREHEN METABOLIC PANEL: CPT

## 2024-12-09 PROCEDURE — 84439 ASSAY OF FREE THYROXINE: CPT

## 2024-12-09 PROCEDURE — 36415 COLL VENOUS BLD VENIPUNCTURE: CPT

## 2024-12-09 PROCEDURE — 84443 ASSAY THYROID STIM HORMONE: CPT

## 2024-12-10 ENCOUNTER — TELEPHONE (OUTPATIENT)
Dept: ENDOCRINOLOGY CLINIC | Facility: CLINIC | Age: 69
End: 2024-12-10

## 2024-12-10 DIAGNOSIS — E05.20 TOXIC MULTINODULAR GOITER: Primary | ICD-10-CM

## 2024-12-10 NOTE — TELEPHONE ENCOUNTER
Patient's daughter states that thyroid labs that were done yesterday showed an abnormal result and would like to speak to RN.  Please call.

## 2024-12-16 NOTE — TELEPHONE ENCOUNTER
Dr. Martínez -- pt had labs done 12/9 and wants to know results. Currently on methimazole 5 mg daily

## 2024-12-18 NOTE — TELEPHONE ENCOUNTER
Hi!    Please let patient know that all of her labs are within normal limits. It is just that the free T3 level is slightly elevated.     Please clarify what dose of MMI she is taking. I have in my last note that she was supposed to take 2.5mg daily. Please ask her to repeat labs in 3 months along with thyroid US and she should see me in clinic at that time. Thank you!

## 2024-12-29 ENCOUNTER — HOSPITAL ENCOUNTER (OUTPATIENT)
Dept: ULTRASOUND IMAGING | Age: 69
Discharge: HOME | End: 2024-12-29
Attending: INTERNAL MEDICINE
Payer: MEDICARE

## 2024-12-29 ENCOUNTER — HOSPITAL ENCOUNTER (OUTPATIENT)
Dept: ULTRASOUND IMAGING | Age: 69
End: 2024-12-29
Attending: INTERNAL MEDICINE
Payer: MEDICARE

## 2024-12-29 DIAGNOSIS — E05.20 TOXIC MULTINODULAR GOITER: ICD-10-CM

## 2024-12-29 PROCEDURE — 76536 US EXAM OF HEAD AND NECK: CPT | Performed by: INTERNAL MEDICINE

## 2025-01-23 DIAGNOSIS — E05.20 TOXIC MULTINODULAR GOITER: ICD-10-CM

## 2025-01-23 NOTE — TELEPHONE ENCOUNTER
Endocrine refill protocol for medications for hypothyroidism and hyperthyroidism    Protocol Criteria:  FAILED Reason: No labs completed in required time frame    If all below requirements are met, send a 90-day supply with 1 refill per provider protocol.    Verify appointment with Endocrinology completed in the last 12 months or scheduled in the next 6 months.    Normal TSH result in the past 12 months   Review recent telephone encounters and mychart communications with patient to ensure a dose change has not occurred since last office visit that was not updated in the medication history list     Last completed office visit:1/31/2024 Lovely Martínez MD   Next scheduled Follow up:   Future Appointments   Date Time Provider Department Center   3/12/2025 11:15 AM Lovely Martínez MD [facility]      Last TSH result:   TSH   Date Value Ref Range Status   12/09/2024 1.139 0.550 - 4.780 uIU/mL Final

## 2025-01-27 RX ORDER — METHIMAZOLE 5 MG/1
2.5 TABLET ORAL EVERY MORNING
Qty: 45 TABLET | Refills: 0 | Status: SHIPPED | OUTPATIENT
Start: 2025-01-27

## 2025-02-19 DIAGNOSIS — Z79.01 ANTICOAGULATION MANAGEMENT ENCOUNTER: ICD-10-CM

## 2025-02-19 DIAGNOSIS — I82.402 DEEP VEIN THROMBOSIS (DVT) OF LEFT LOWER EXTREMITY, UNSPECIFIED CHRONICITY, UNSPECIFIED VEIN (HCC): ICD-10-CM

## 2025-02-19 DIAGNOSIS — Z51.81 ANTICOAGULATION MANAGEMENT ENCOUNTER: ICD-10-CM

## 2025-03-12 ENCOUNTER — OFFICE VISIT (OUTPATIENT)
Dept: ENDOCRINOLOGY CLINIC | Facility: CLINIC | Age: 70
End: 2025-03-12

## 2025-03-12 VITALS
RESPIRATION RATE: 16 BRPM | SYSTOLIC BLOOD PRESSURE: 139 MMHG | HEIGHT: 65 IN | WEIGHT: 172.19 LBS | HEART RATE: 68 BPM | DIASTOLIC BLOOD PRESSURE: 71 MMHG | BODY MASS INDEX: 28.69 KG/M2

## 2025-03-12 DIAGNOSIS — E05.20 TOXIC MULTINODULAR GOITER: Primary | ICD-10-CM

## 2025-03-12 PROCEDURE — 99214 OFFICE O/P EST MOD 30 MIN: CPT | Performed by: INTERNAL MEDICINE

## 2025-03-12 RX ORDER — METHIMAZOLE 5 MG/1
2.5 TABLET ORAL
Qty: 45 TABLET | Refills: 3 | Status: SHIPPED | OUTPATIENT
Start: 2025-03-12

## 2025-03-12 NOTE — PROGRESS NOTES
Name: Emily Henderson  Date: 3/12/25    Referring Physician: No ref. provider found    Chief Complaint   Patient presents with    Thyroid Problem     Follow-Up       HISTORY OF PRESENT ILLNESS   Emily Henderson is a 69 year old female who presents for   Chief Complaint   Patient presents with    Thyroid Problem     Follow-Up   .  This is a 69 year-old woman who was first diagnosed and treated for toxic multinodular goiter in 2003 with SHAIKH. Three years later, she again developed signs and symptoms of hyperthyroidism and was started on methimazole at a very low dose.     She was restarted on methimazole about three years after this and has been on this ever since (maybe 15 years).     She has also had 2 biopsies of her thyroid nodules which were benign (2009 and 2015). She has had an abnormal thyroid uptake and scan in 2019 at [facility].    A few visits ago, we had continued her on MMI 2.5 mg PO qday and asked her to come back to the clinic in 6 months with repeat blood tests and thyroid US.    At the last visit, she had a TSH that was less than the lower limit of normal and so I increased her MMI dose from 2.5mg to 5.0mg. At the last visit, I had again decreased the dose to 2.5mg and she is doing well on this dose. She is here in follow up. She is doing well. She does not have any trouble swallowing.     Medical History:   Past Medical History:    Hyperthyroidism       Surgical History:   Past Surgical History:   Procedure Laterality Date    Partial hip replacement      left        Family History:  Family History   Problem Relation Age of Onset    Diabetes Neg     Glaucoma Neg        Social History:   Social History     Socioeconomic History    Marital status:    Tobacco Use    Smoking status: Never     Passive exposure: Never    Smokeless tobacco: Never   Vaping Use    Vaping status: Never Used   Substance and Sexual Activity    Alcohol use: Not Currently    Drug use: Never   Other Topics Concern     Reaction to local anesthetic No     Social Drivers of Health      Received from [facility]    Housing Stability       Medications:     Current Outpatient Medications:     apixaban 2.5 MG Oral Tab, Take 1 tablet (2.5 mg total) by mouth 2 (two) times daily., Disp: 180 tablet, Rfl: 3    METHIMAZOLE 5 MG Oral Tab, TAKE HALF TABLET BY MOUTH IN THE MORNING, Disp: 45 tablet, Rfl: 0    amLODIPine 2.5 MG Oral Tab, Take 1 tablet (2.5 mg total) by mouth daily., Disp: 90 tablet, Rfl: 1    diclofenac 1 % External Gel, Apply 4 gRAMS OF GEL topically four times daily., Disp: , Rfl:     cloNIDine 0.1 MG Oral Tab, Take 1 tablet (0.1 mg total) by mouth 2 (two) times daily. Takes if SBP <160, Disp: , Rfl:     Cholecalciferol (VITAMIN D) 50 MCG (2000 UT) Oral Tab, Take by mouth daily., Disp: , Rfl:     Calcium Carbonate Antacid 600 MG Oral Chew Tab, Chew 600 mg by mouth., Disp: , Rfl:     Biotin 5000 MCG Oral Cap, Take by mouth., Disp: , Rfl:      Allergies:   No Known Allergies    REVIEW OF SYSTEMS  Eyes: no change in vision  Neurologic: no headache, generalized or focal weakness or numbness.  Head: normal  ENT: normal  Lungs: no shortness of breath, wheezing or PURVIS  Cardiovascular:  no chest pain or palpitations  Gastrointestinal:  no abdominal pain, bowel movement problems  Musculoskeletal: no muscle pain or arthralgia  /Gyne: no frequency or discomfort while urinating  Psychiatric:  no acute distress, anxiety  or depression  Skin: normal moisturized skin    PHYSICAL EXAMINATION:  /71 (BP Location: Left arm)   Pulse 68   Resp 16   Ht 5' 5\" (1.651 m)   Wt 172 lb 3.2 oz (78.1 kg)   BMI 28.66 kg/m²     General Appearance:  Alert, in no acute distress, well developed  Eyes: normal conjunctivae, sclera.  Ears/Nose/Mouth/Throat/Neck:  normal hearing, normal speech and slightly enlarged thyroid gland  Psychiatric:  oriented to time, self, and place  Nutritional:  no abnormal weight gain or  loss    Labs:  Date  TSH  FT4 FT3 MMI   04/24/21 0.870  1.1 3.04  01/04/22 0.777  1.1 2.54    06/08/22 1.120  1.1 3.25  10/21/22 0.274  1.2   09/28/23 2.430  1.1  5  12/09/24 1.139  1.3 4.45 2.5      FNA (2015):     7/10/15:  CYTOPATHOLOGY - FINE NEEDLE ASPIRATION    Diagnosis:    1.  THYROID, RIGHT LOBE, ULTRASOUND-GUIDED FNA:    SATISFACTORY FOR EVALUATION.  BENIGN.  BENIGN FOLLICULAR NODULE.     2.  THYROID, LEFT LOBE, ULTRASOUND-GUIDED FNA:    SATISFACTORY FOR EVALUATION.  BENIGN.  BENIGN FOLLICULAR NODULE.     3.  THYROID, LEFT INFERIOR, ULTRASOUND-GUIDED FNA:    SATISFACTORY FOR EVALUATION.  BENIGN.  BENIGN FOLLICULAR NODULE.           Imaging:  PROCEDURE: US THYROID (CPT= 07961)     COMPARISON: US HEAD NECK, 7/19/2007, 5:05 PM.  US PF GUIDED FNA, 2/20/2009, 10:22 AM.  [facility], US THYROID (CPT= 87866), 1/04/2022, 4:31 PM.  [facility], US THYROID (CPT= 97041),  1/19/2023, 10:03 AM.  [facility], US THYROID (CPT= 88234), 1/24/2024, 10:08 AM.     INDICATIONS: Toxic multinodular goiter     TECHNIQUE: High-resolution ultrasound was performed of the thyroid gland.     FINDINGS:  RIGHT LOBE: 6.4 x 1.8 x 2.7 cm,  14.8 ml.  Mildly heterogeneous parenchymal echogenicity.  Within the interpolar right lobe, there is a stable solid isoechoic nodule that measures 2.7 x 2.1 x 1.3 cm (TR-3).  Within the inferior right lobe, there is a  stable 0.9 x 0.8 x 0.7 cm solid hypoechoic nodule with probable peripheral calcification (TR-4).     LEFT LOBE: 8.2 x 4.3 x 5.7 cm,  95.1 ml.  Mildly heterogeneous parenchymal echogenicity.  Very large 5.9 x 3.6 x 4.5 cm solid hyperechoic nodule (TR-3) that replaces nearly the entirety of the left lobe and appears similar in size and morphology as  compared with prior.  Small coarse calcification also noted in the left lobe.     ISTHMUS: 0.1 cm AP diameter in the midline.  Relatively homogeneous  parenchymal echogenicity.  No masses.       OTHER: No masses or adenopathy.                 Impression   CONCLUSION:  1. Subcentimeter TR-4 nodule in the lower pole of the right lobe is unchanged since prior exam from January, 2024.  This requires continued 12 month surveillance imaging.  2. Other larger/dominant left greater than right lobe TR-3 nodules are also unchanged.  Nodule in the left reportedly underwent FNA in the past, please correlate with pathology results.  Both nodules can be reassessed on anticipated follow-up to ensure  continued stability.          elm-remote     Dictated by (CST): Justin Zazueta MD on 12/31/2024 at 9:23 AM      Finalized by (CST): Justin Zazueta MD on 12/31/2024 at 9:28 AM             NM Thyroid Uptake and Scan:  10/2019:  IMPRESSION:     1. The left thyroid lobe appears larger than the right with question of a decreased area of    activity in the superior aspect of the left thyroid lobe. A cold nodule cannot entirely be    excluded. Recommend ultrasound for correlation.    2. 24 hour uptake is mildly above normal limits (40.7%, normal is less than 33%).         ASSESSMENT/PLAN: This is a 69 year-old woman here for follow-up of management of hyperthyroidism secondary to toxic multinodular goiter.  1. Hyperthyroidism:  - Etiology: Autonomous hormone production secondary to Toxic multi nodular goiter  NM scan and uptake showed: see above  - Therapy options and risks and benefits discussed: Radioactive iodine (SHAIKH) vs Surgery vs anti thyroid drugs (ATD) x 18 months.    -  Patient is aware of the side effects of all of the treatment modalities and has already elected to be treated with MMI.   ATD:  Continue Methimazole 2.5mg PO qday  - Side effects discussed:  > pruritus, rash, urticaria, arthralgias, arthritis, fever, abnormal taste sensation, nausea, or vomiting in up to 13 percent of patients   > agranulocytosis (prevalence of 0.1 to 0.5 %, and usually occurs within the first  two months of treatment, but may occur later)  > congenital malformations associated with methimazole : fetal scalp defect, aplasia cutis , choanal atresia, tracheoesophageal fistulas   > Hepatotoxicity  discussed and the patient knows that she should stop MMI and come to the ER/call the office if she experiences sore throat, fever, jaundice, dark urine, light stools, abdominal pain, anorexia, nausea, rash or joint pains,  Check TSH, FT4, FT3, CMP, and CBC with diff in 1 year  Thyroid US shows that nodules are stable and actually have increased in size compared to 2015, when she had her last FNA.   I would also like to refer her to ENT so that she can become acquainted to the total thyroidectomy procedure in case it becomes necessary.   I will follow up with patient once test results are back  Return to clinic in 1 year  Prior to this encounter, I spent over 15 minutes with preparing for the visit, including reviewing documents from other specialties as well as from PCP and going over test results and imaging studies. During the face to face encounter, I spent an additional 15 minutes which were determined for follow-up. Greater than 50% of the time was spent in counseling, anticipatory guidance, and coordination of care. Patient concerns were answered to the best of my knowledge.       3/12/25  Lovely Martínez MD

## 2025-04-11 ENCOUNTER — PATIENT MESSAGE (OUTPATIENT)
Dept: ENDOCRINOLOGY CLINIC | Facility: CLINIC | Age: 70
End: 2025-04-11

## 2025-04-22 ENCOUNTER — TELEPHONE (OUTPATIENT)
Dept: INTERNAL MEDICINE CLINIC | Facility: CLINIC | Age: 70
End: 2025-04-22

## 2025-04-22 DIAGNOSIS — E05.90 HYPERTHYROIDISM: Primary | ICD-10-CM

## 2025-04-22 DIAGNOSIS — E04.1 THYROID NODULE: ICD-10-CM

## 2025-04-22 NOTE — TELEPHONE ENCOUNTER
Patient need a referral to see endocrinologist this Friday. isai Boone       Phone number: [phone]    Fax number:[phone]

## 2025-04-22 NOTE — TELEPHONE ENCOUNTER
Dr Tomas,     Daughter called requesting referral to Dr. Scott for thyroid nodule.     Pended referral please review diagnosis and sign off if you agree.    Thank you.  Silvia Ball  [facility]

## 2025-05-05 ENCOUNTER — PATIENT MESSAGE (OUTPATIENT)
Age: 70
End: 2025-05-05

## 2025-05-12 NOTE — DISCHARGE INSTRUCTIONS
Discharge Instructions for Fine-Needle Thyroid Biopsy   You have had a procedure called fine-needle thyroid biopsy. This biopsy was done to learn more about a nodule or cyst in your thyroid gland. Or it was done to find out what might be causing enlargement of your thyroid. During the biopsy, a very thin needle is inserted through the skin into the gland. The needle is used to remove a small amount of tissue (called a sample) from the gland. More than 1 tissue sample may be done. This is to be sure to get cells from all parts of the nodule. Or the needle might be used to drain fluid from a cyst. Often a special ultrasound probe (transducer) is used to help guide the needle to the right place. The tissue or fluid is then checked in a lab by a pathologist.    Home care  Here are some tips to take care of yourself at home:    You will have a small adhesive bandage on your biopsy site. Your healthcare provider will tell you when you can remove the bandage. After that, you don't need to keep the site covered.   If you feel discomfort after the biopsy, take over-the-counter pain medicine, such as acetaminophen. Don't take aspirin or ibuprofen. It's normal to feel sore for 1 or 2 days.  Ask your provider when you can return to normal activities. This will likely be the same day as your procedure.  Getting your results  Your biopsy results may take a few days. When the results are ready, your healthcare provider will discuss them with you. They will tell you if anything needs to be done next. If you have questions, consider writing them down. Then you won't forget to ask them when the provider calls.   Follow-up  Make a follow-up appointment as advised by your provider.   When to call your healthcare provider  Call your provider right away if you have any of the following:   Bleeding that won’t stop  Fever of 100.4°F (38°C) or higher, or as advised by your provider  Increasing pain, redness, soreness, or fluid leaking at  the biopsy site  Swelling of the biopsy site  Be sure you understand what problems you should watch for. Know how to reach the healthcare provider after hours and on weekends and holidays.    Call 911  Call 911 right away if you have:   Shortness of breath  Trouble breathing or speaking  A change in your voice    THANK YOU, WE WISH YOU WELL

## 2025-05-13 RX ORDER — AMLODIPINE BESYLATE 2.5 MG/1
2.5 TABLET ORAL DAILY
Qty: 90 TABLET | Refills: 3 | Status: SHIPPED | OUTPATIENT
Start: 2025-05-13

## 2025-05-15 ENCOUNTER — HOSPITAL ENCOUNTER (OUTPATIENT)
Dept: ULTRASOUND IMAGING | Facility: HOSPITAL | Age: 70
Discharge: HOME | End: 2025-05-15
Attending: INTERNAL MEDICINE
Payer: MEDICARE

## 2025-05-15 VITALS — SYSTOLIC BLOOD PRESSURE: 140 MMHG | RESPIRATION RATE: 16 BRPM | HEART RATE: 75 BPM | DIASTOLIC BLOOD PRESSURE: 85 MMHG

## 2025-05-15 DIAGNOSIS — E05.20 TOXIC MULTINODULAR GOITER: ICD-10-CM

## 2025-05-15 PROCEDURE — 10005 FNA BX W/US GDN 1ST LES: CPT | Performed by: INTERNAL MEDICINE

## 2025-05-15 PROCEDURE — 88172 CYTP DX EVAL FNA 1ST EA SITE: CPT | Performed by: INTERNAL MEDICINE

## 2025-05-15 PROCEDURE — 88173 CYTOPATH EVAL FNA REPORT: CPT | Performed by: INTERNAL MEDICINE

## 2025-05-15 PROCEDURE — 88177 CYTP FNA EVAL EA ADDL: CPT | Performed by: INTERNAL MEDICINE

## 2025-05-15 NOTE — IMAGING NOTE
1400 Pt arrived to ultrasound room #  3    1410 Scans taken by JOSE ultrasound  sonographer     1412 History taken and as follows:  abnormal thyroid US. Hx thyroid FNA in 2015- benign.      1413 Procedure explained questions answered.    1417 Consent verified and obtained      1419  scans reviewed by Dr. POPE    Site marked BILATERAL THYROID NODULE     1419 Time out taken      1422 Area cleaned sterile towels  to site. Pathology  was  notified.      1424 Lidocaine 1% 10 milligrams per ml  from kit  was given 2 ml         SITE #1- RIGHT THYROID NODULE  FNA # 1 taken at  1428 with 25 g needle    FNA # 2 taken at 1429  with 25 g needle    1434 Lidocaine 1% 10 milligrams per ml  from kit  was given 2 ml         SITE #2- LEFT THYROID NODULE  FNA # 1 taken at  1436 with 25 g needle    FNA # 2 taken at 1437  with 25 g needle    1442 Procedure completed area re scanned . Area cleaned band aid to site ice pack to site.        1447 Post instructions given  verbal et written with AVS summary sheet provided to patient.  Also instructed patient to refrain from drinking or eating anything hot for several hours after biopsy  to prevent increase bleeding from occurring.    1450  Pt  discharged comfortably with daughter.

## 2025-05-16 ENCOUNTER — TELEPHONE (OUTPATIENT)
Dept: ENDOCRINOLOGY CLINIC | Facility: CLINIC | Age: 70
End: 2025-05-16

## 2025-05-16 NOTE — TELEPHONE ENCOUNTER
Please call patient - good news, thyroid biopsy is benign.  We will plan to monitor with US in one year. Thanks.

## 2025-05-16 NOTE — TELEPHONE ENCOUNTER
Spoke to pt's daughter, Elsa (per TIFFANIE on file).   Provided results and recommendations written below by MD. She verbalized understanding, denies further questions or concerns.

## (undated) NOTE — LETTER
[facility] ULTRASOUND  [address] [address]  [phone]  [phone]  Authorization for Imaging Procedure  Date of Procedure:     I hereby authorize  ____________, my physician and his/her assistants (if applicable), which may include medical students, residents, and/or fellows, to perform the following procedure and administer such anesthesia as may be determined necessary by my physician: ULTRASOUND GUIDED FINE NEEDLE ASPIRATION BILATERAL THYROID NODULES -2 SITES,  FIRST LESION on Emily Henderson.   2.  I recognize that during the procedure, unforeseen conditions may necessitate additional or different procedures than those listed above. I, therefore, further authorize and request that the above-named physician, assistants, or designees perform such procedures as are, in their judgment, necessary and desirable.    3.  My physician has discussed prior to my procedure the potential benefits, risks and side effects of this procedure; the likelihood of achieving goals; and potential problems that might occur during recuperation. They also discussed reasonable alternatives to the procedure, including risks, benefits, and side effects related to the alternatives and risks related to not receiving this procedure. I have had all my questions answered and I acknowledge that no guarantee has been made as to the result that may be obtained.    4.  Should the need arise during my procedure, which includes change of level of care prior to discharge, I also consent to the administration of blood and/or blood products. Further, I understand that despite careful testing and screening of blood or blood products by collecting agencies, I may still be subject to ill effects as a result of receiving a blood transfusion and/or blood products. The following are some, but not all, of the potential risks that can occur: fever and allergic reactions, hemolytic reactions, transmission of diseases such as  Hepatitis, AIDS and Cytomegalovirus (CMV) and fluid overload. In the event that I wish to have an autologous transfusion of my own blood, or a directed donor transfusion, I will discuss this with my physician.  Check only if Refusing Blood or Blood Products  I understand refusal of blood or blood products as deemed necessary by my physician may have serious consequences to my condition to include possible death. I hereby assume responsibility for my refusal and release the hospital, its personnel, and my physicians from any responsibility for the consequences of my refusal.   [  ] Patient Refuses Blood      5.  I authorize the use of any specimen, organs, tissues, body parts or foreign objects that may be removed from my body during the procedure for diagnosis, research or teaching purposes and their subsequent disposal by hospital authorities. I also authorize the release of specimen test results and/or written reports to my treating physician on the hospital medical staff or other referring or consulting physicians involved in my care, at the discretion of the Pathologist or my treating physician.    6.  I consent to the photographing or videotaping of the procedures to be performed, including appropriate portions of my body for medical, scientific, or educational purposes, provided my identity is not revealed by the pictures or by descriptive texts accompanying them. If the procedure has been photographed/videotaped, the physician will obtain the original picture, image, videotape or CD. The hospital will not be responsible for storage, release or maintenance of the picture, image, tape or CD.   7.  I consent to the presence of a  or observers in the operating room as deemed necessary by my physician or their designees.    8.  I recognize that in the event my procedure results in extended X-Ray/fluoroscopy time, I may develop a skin reaction.    9.  If I have a Do Not Attempt Resuscitation  (DNAR) order in place, that status will be suspended while in the operating room, procedural suite, and during the recovery period unless otherwise explicitly stated by me (or a person authorized to consent on my behalf). The performing physician or my attending physician will determine when the applicable recovery period ends for purposes of reinstating the DNAR order.  10.  I acknowledge that my physician has explained sedation/analgesia administration to me including the risk and benefits I consent to the administration of sedation/analgesia as may be necessary or desirable in the judgment of my physician.      I CERTIFY THAT I HAVE READ AND FULLY UNDERSTAND THE ABOVE CONSENT FOR THE PROCEDURE.     Signature of Patient: _____________________________________________________________  Responsible person in case of minor, unconscious: ____________________________________  Relationship to patient:  __________________________________________________________    Signature of Witness: _______________________________Date: _________Time: __________  Statement of Physician: My signature below affirms that prior to the time of the procedure, I have explained to the patient and/or her guardian, the risks and benefits involved in the proposed treatment and any reasonable alternative to the proposed treatment. I have also explained the risks and benefits involved in the refusal of the proposed treatment and have answered the patient's questions. If I have a significant financial interest in a co-management agreement or a significant financial interest in any product or implant, or other significant relationship used in the procedure/surgery, I have disclosed this and had a discussion with my patient.  Signature of Physician:   _________________________________Date:_____________Time:________  Patient Name: Emily Henderson : 1955  Printed: May 12, 2025  Medical Record #: [id number]

## (undated) NOTE — LETTER
9/20/2024              Emily Henderson        [id number] [place] [address]         Dear Emily,    Medical Grade Compression Hose        Patient: Emily Henderson      YOB: 1955           Diagnosis: Varicose Veins with Pain- Left Leg: I83.812       Compression: 20-30mmHg- Moderate  Style: Velcro Straps Compressions       Amount: X 2  HCPS: - Custom Fit          Physician Signature:  NAV Vazquez  Date:  09/20/24

## (undated) NOTE — LETTER
AUTHORIZATION FOR SURGICAL OPERATION OR OTHER PROCEDURE    1. I hereby authorize Dr. Magda Good  and [address] [place] [address] staff assigned to my case to perform the following operation and/or procedure at the [address] [place] [address]:    Corraction of ingrown toenail on Right big toe   _______________________________________________________________________________________________      _______________________________________________________________________________________________    2. My physician has explained the nature and purpose of the operation or other procedure, possible alternative methods of treatment, the risks involved, and the possibility of complication to me. I acknowledge that no guarantee has been made as to the result that may be obtained. 3.  I recognize that, during the course of this operation, or other procedure, unforseen conditions may necessitate additional or different procedure than those listed above. I, therefore, further authorize and request that the above named physician, his/her physician assistants or designees perform such procedures as are, in his/her professional opinion, necessary and desirable. 4.  Any tissue or organs removed in the operation or other procedure may be disposed of by and at the discretion of the [address] [place] [address] and [facility]. 5.  I understand that in the event of a medical emergency, I will be transported by local paramedics to [facility] or other hospital emergency department. 6.  I certify that I have read and fully understand the above consent to operation and/or other procedure. 7.  I acknowledge that my physician has explained sedation/analgesia administration to me including the risks and benefits. I consent to the administration of sedation/analgesia as may be necessary or desirable in the judgement of my physician.     Witness signature: ___________________________________________________ Date: ______/______/_____                    Time:  ________ A. M.  P.M. Patient Name:  ______________________________________________________  (please print)      Patient signature:  ___________________________________________________             Relationship to Patient:           []  Parent    Responsible person                          []  Spouse  In case of minor or                    [] Other  _____________   Incompetent name:  __________________________________________________                               (please print)      _____________      Responsible person  In case of minor or  Incompetent signature:  _______________________________________________    Statement of Physician  My signature below affirms that prior to the time of the procedure, I have explained to the patient and/or his/her guardian, the risks and benefits involved in the proposed treatment and any reasonable alternative to the proposed treatment. I have also explained the risks and benefits involved in the refusal of the proposed treatment and have answered the patient's questions.                         Date:  ______/______/_______  Provider                      Signature:  __________________________________________________________       Time:  ___________ A.M    P.M.

## (undated) NOTE — LETTER
AUTHORIZATION FOR SURGICAL OPERATION OR OTHER PROCEDURE    1. I hereby authorize Dr. Felton Mendez, and [facility] staff assigned to my case to perform the following operation and/or procedure at the [facility]:    _______________________________________________________________________________________________    Left Great toe ingrown toenail procedure  _______________________________________________________________________________________________    2. My physician has explained the nature and purpose of the operation or other procedure, possible alternative methods of treatment, the risks involved, and the possibility of complication to me. I acknowledge that no guarantee has been made as to the result that may be obtained. 3.  I recognize that, during the course of this operation, or other procedure, unforseen conditions may necessitate additional or different procedure than those listed above. I, therefore, further authorize and request that the above named physician, his/her physician assistants or designees perform such procedures as are, in his/her professional opinion, necessary and desirable. 4.  Any tissue or organs removed in the operation or other procedure may be disposed of by and at the discretion of the [facility] and [facility]. 5.  I understand that in the event of a medical emergency, I will be transported by local paramedics to [facility] or other hospital emergency department. 6.  I certify that I have read and fully understand the above consent to operation and/or other procedure. 7.  I acknowledge that my physician has explained sedation/analgesia administration to me including the risks and benefits. I consent to the administration of sedation/analgesia as may be necessary or desirable in the judgement of my physician.     Witness signature: ___________________________________________________ Date:  ______/______/_____ Time:  ________ A. M.  P.M. Patient Name:  ______________________________________________________  (please print)      Patient signature:  ___________________________________________________             Relationship to Patient:           []  Parent    Responsible person                          []  Spouse  In case of minor or                    [] Other  _____________   Incompetent name:  __________________________________________________                               (please print)      _____________      Responsible person  In case of minor or  Incompetent signature:  _______________________________________________    Statement of Physician  My signature below affirms that prior to the time of the procedure, I have explained to the patient and/or his/her guardian, the risks and benefits involved in the proposed treatment and any reasonable alternative to the proposed treatment. I have also explained the risks and benefits involved in the refusal of the proposed treatment and have answered the patient's questions.                         Date:  ______/______/_______  Provider                      Signature:  __________________________________________________________       Time:  ___________ A.M    P.M.

## (undated) NOTE — LETTER
8/13/2024              Emily Henderson        [id number] [place] [address]         Dear Emily,    Medical Grade Compression Hose        Patient: Emily Henderson      YOB: 1955           Diagnosis: Varicose Veins with Pain- Left Leg: I83.812       Compression: 20-30mmHg- Moderate  Style: Thigh-High        Amount: X 2  HCPS: - Thigh High          Physician Signature: _____________________  Date:  08/13/24    Sincerely,    Fabian Kinsey APRN